# Patient Record
Sex: MALE | Race: WHITE | Employment: OTHER | URBAN - METROPOLITAN AREA
[De-identification: names, ages, dates, MRNs, and addresses within clinical notes are randomized per-mention and may not be internally consistent; named-entity substitution may affect disease eponyms.]

---

## 2021-01-15 ENCOUNTER — HOSPITAL ENCOUNTER (INPATIENT)
Age: 71
LOS: 5 days | Discharge: HOME | DRG: 177 | End: 2021-01-20
Attending: EMERGENCY MEDICINE | Admitting: STUDENT IN AN ORGANIZED HEALTH CARE EDUCATION/TRAINING PROGRAM
Payer: MEDICARE

## 2021-01-15 ENCOUNTER — APPOINTMENT (OUTPATIENT)
Dept: GENERAL RADIOLOGY | Age: 71
DRG: 177 | End: 2021-01-15
Payer: MEDICARE

## 2021-01-15 DIAGNOSIS — U07.1 COVID-19: Primary | ICD-10-CM

## 2021-01-15 DIAGNOSIS — R06.89 DYSPNEA AND RESPIRATORY ABNORMALITIES: ICD-10-CM

## 2021-01-15 DIAGNOSIS — R06.00 DYSPNEA AND RESPIRATORY ABNORMALITIES: ICD-10-CM

## 2021-01-15 DIAGNOSIS — D72.819 LEUKOPENIA, UNSPECIFIED TYPE: ICD-10-CM

## 2021-01-15 PROBLEM — J12.82 PNEUMONIA DUE TO COVID-19 VIRUS: Status: ACTIVE | Noted: 2021-01-15

## 2021-01-15 LAB
ALBUMIN SERPL-MCNC: 3.4 GM/DL (ref 3.4–5)
ALP BLD-CCNC: 58 IU/L (ref 40–129)
ALT SERPL-CCNC: 33 U/L (ref 10–40)
ANION GAP SERPL CALCULATED.3IONS-SCNC: 14 MMOL/L (ref 4–16)
AST SERPL-CCNC: 31 IU/L (ref 15–37)
BANDED NEUTROPHILS ABSOLUTE COUNT: 0.25 K/CU MM
BANDED NEUTROPHILS RELATIVE PERCENT: 10 % (ref 5–11)
BASE EXCESS: 1 (ref 0–3.3)
BILIRUB SERPL-MCNC: 0.3 MG/DL (ref 0–1)
BUN BLDV-MCNC: 16 MG/DL (ref 6–23)
CALCIUM SERPL-MCNC: 8.4 MG/DL (ref 8.3–10.6)
CHLORIDE BLD-SCNC: 97 MMOL/L (ref 99–110)
CO2: 22 MMOL/L (ref 21–32)
COMMENT: ABNORMAL
CREAT SERPL-MCNC: 0.8 MG/DL (ref 0.9–1.3)
DIFFERENTIAL TYPE: ABNORMAL
GFR AFRICAN AMERICAN: >60 ML/MIN/1.73M2
GFR NON-AFRICAN AMERICAN: >60 ML/MIN/1.73M2
GLUCOSE BLD-MCNC: 118 MG/DL (ref 70–99)
HCO3 VENOUS: 24.9 MMOL/L (ref 19–25)
HCT VFR BLD CALC: 41.1 % (ref 42–52)
HEMOGLOBIN: 13.4 GM/DL (ref 13.5–18)
LACTATE: 1.5 MMOL/L (ref 0.4–2)
LYMPHOCYTES ABSOLUTE: 0.7 K/CU MM
LYMPHOCYTES RELATIVE PERCENT: 28 % (ref 24–44)
MAGNESIUM: 1.8 MG/DL (ref 1.8–2.4)
MCH RBC QN AUTO: 30.5 PG (ref 27–31)
MCHC RBC AUTO-ENTMCNC: 32.6 % (ref 32–36)
MCV RBC AUTO: 93.4 FL (ref 78–100)
MONOCYTES ABSOLUTE: 0.4 K/CU MM
MONOCYTES RELATIVE PERCENT: 16 % (ref 0–4)
O2 SAT, VEN: 77.4 % (ref 50–70)
PCO2, VEN: 43 MMHG (ref 38–52)
PDW BLD-RTO: 14.1 % (ref 11.7–14.9)
PH VENOUS: 7.37 (ref 7.32–7.42)
PLATELET # BLD: 168 K/CU MM (ref 140–440)
PLT MORPHOLOGY: ABNORMAL
PMV BLD AUTO: 11 FL (ref 7.5–11.1)
PO2, VEN: 47 MMHG (ref 28–48)
POTASSIUM SERPL-SCNC: 3.7 MMOL/L (ref 3.5–5.1)
PRO-BNP: 60.77 PG/ML
RBC # BLD: 4.4 M/CU MM (ref 4.6–6.2)
SARS-COV-2, NAAT: DETECTED
SEGMENTED NEUTROPHILS ABSOLUTE COUNT: 1.1 K/CU MM
SEGMENTED NEUTROPHILS RELATIVE PERCENT: 46 % (ref 36–66)
SODIUM BLD-SCNC: 133 MMOL/L (ref 135–145)
TOTAL PROTEIN: 7 GM/DL (ref 6.4–8.2)
TROPONIN T: <0.01 NG/ML
WBC # BLD: 2.5 K/CU MM (ref 4–10.5)
WBC # BLD: ABNORMAL 10*3/UL

## 2021-01-15 PROCEDURE — 94640 AIRWAY INHALATION TREATMENT: CPT

## 2021-01-15 PROCEDURE — 83605 ASSAY OF LACTIC ACID: CPT

## 2021-01-15 PROCEDURE — 85007 BL SMEAR W/DIFF WBC COUNT: CPT

## 2021-01-15 PROCEDURE — 87899 AGENT NOS ASSAY W/OPTIC: CPT

## 2021-01-15 PROCEDURE — 83735 ASSAY OF MAGNESIUM: CPT

## 2021-01-15 PROCEDURE — 2580000003 HC RX 258: Performed by: PHYSICIAN ASSISTANT

## 2021-01-15 PROCEDURE — U0002 COVID-19 LAB TEST NON-CDC: HCPCS

## 2021-01-15 PROCEDURE — 6360000002 HC RX W HCPCS: Performed by: PHYSICIAN ASSISTANT

## 2021-01-15 PROCEDURE — 84484 ASSAY OF TROPONIN QUANT: CPT

## 2021-01-15 PROCEDURE — 82805 BLOOD GASES W/O2 SATURATION: CPT

## 2021-01-15 PROCEDURE — 6370000000 HC RX 637 (ALT 250 FOR IP): Performed by: PHYSICIAN ASSISTANT

## 2021-01-15 PROCEDURE — 96360 HYDRATION IV INFUSION INIT: CPT

## 2021-01-15 PROCEDURE — 87804 INFLUENZA ASSAY W/OPTIC: CPT

## 2021-01-15 PROCEDURE — 93005 ELECTROCARDIOGRAM TRACING: CPT | Performed by: PHYSICIAN ASSISTANT

## 2021-01-15 PROCEDURE — 2700000000 HC OXYGEN THERAPY PER DAY

## 2021-01-15 PROCEDURE — 71045 X-RAY EXAM CHEST 1 VIEW: CPT

## 2021-01-15 PROCEDURE — 94761 N-INVAS EAR/PLS OXIMETRY MLT: CPT

## 2021-01-15 PROCEDURE — 87040 BLOOD CULTURE FOR BACTERIA: CPT

## 2021-01-15 PROCEDURE — 93010 ELECTROCARDIOGRAM REPORT: CPT | Performed by: INTERNAL MEDICINE

## 2021-01-15 PROCEDURE — 1200000000 HC SEMI PRIVATE

## 2021-01-15 PROCEDURE — 80053 COMPREHEN METABOLIC PANEL: CPT

## 2021-01-15 PROCEDURE — 85027 COMPLETE CBC AUTOMATED: CPT

## 2021-01-15 PROCEDURE — 83880 ASSAY OF NATRIURETIC PEPTIDE: CPT

## 2021-01-15 PROCEDURE — 99285 EMERGENCY DEPT VISIT HI MDM: CPT

## 2021-01-15 RX ORDER — GUAIFENESIN 600 MG/1
600 TABLET, EXTENDED RELEASE ORAL 2 TIMES DAILY
Status: DISCONTINUED | OUTPATIENT
Start: 2021-01-15 | End: 2021-01-20 | Stop reason: HOSPADM

## 2021-01-15 RX ORDER — LEVOTHYROXINE SODIUM 0.05 MG/1
50 TABLET ORAL DAILY
COMMUNITY

## 2021-01-15 RX ORDER — ATORVASTATIN CALCIUM 40 MG/1
40 TABLET, FILM COATED ORAL DAILY
Status: DISCONTINUED | OUTPATIENT
Start: 2021-01-15 | End: 2021-01-20 | Stop reason: HOSPADM

## 2021-01-15 RX ORDER — LOSARTAN POTASSIUM 25 MG/1
25 TABLET ORAL DAILY
COMMUNITY

## 2021-01-15 RX ORDER — SODIUM CHLORIDE 9 MG/ML
INJECTION, SOLUTION INTRAVENOUS PRN
Status: DISCONTINUED | OUTPATIENT
Start: 2021-01-15 | End: 2021-01-20 | Stop reason: HOSPADM

## 2021-01-15 RX ORDER — DEXAMETHASONE SODIUM PHOSPHATE 10 MG/ML
6 INJECTION, SOLUTION INTRAMUSCULAR; INTRAVENOUS EVERY 6 HOURS
Status: DISCONTINUED | OUTPATIENT
Start: 2021-01-15 | End: 2021-01-15 | Stop reason: HOSPADM

## 2021-01-15 RX ORDER — HYDROCODONE BITARTRATE AND ACETAMINOPHEN 10; 325 MG/1; MG/1
1 TABLET ORAL EVERY 4 HOURS PRN
COMMUNITY

## 2021-01-15 RX ORDER — HYDROCODONE BITARTRATE AND ACETAMINOPHEN 10; 325 MG/15ML; MG/15ML
5 SOLUTION ORAL EVERY 4 HOURS PRN
COMMUNITY
End: 2021-01-15

## 2021-01-15 RX ORDER — DONEPEZIL HYDROCHLORIDE 10 MG/1
10 TABLET, FILM COATED ORAL NIGHTLY
COMMUNITY

## 2021-01-15 RX ORDER — GLIPIZIDE 5 MG/1
2.5 TABLET ORAL
Status: DISCONTINUED | OUTPATIENT
Start: 2021-01-16 | End: 2021-01-20 | Stop reason: HOSPADM

## 2021-01-15 RX ORDER — METHYLPREDNISOLONE SODIUM SUCCINATE 40 MG/ML
40 INJECTION, POWDER, LYOPHILIZED, FOR SOLUTION INTRAMUSCULAR; INTRAVENOUS EVERY 12 HOURS
Status: DISCONTINUED | OUTPATIENT
Start: 2021-01-15 | End: 2021-01-20

## 2021-01-15 RX ORDER — ACETAMINOPHEN 500 MG
1000 TABLET ORAL ONCE
Status: COMPLETED | OUTPATIENT
Start: 2021-01-15 | End: 2021-01-15

## 2021-01-15 RX ORDER — GLIPIZIDE 5 MG/1
2.5 TABLET ORAL
COMMUNITY

## 2021-01-15 RX ORDER — MECLIZINE HYDROCHLORIDE CHEWABLE TABLETS 25 MG/1
25 TABLET, CHEWABLE ORAL 2 TIMES DAILY PRN
COMMUNITY

## 2021-01-15 RX ORDER — SODIUM CHLORIDE 0.9 % (FLUSH) 0.9 %
10 SYRINGE (ML) INJECTION EVERY 12 HOURS SCHEDULED
Status: DISCONTINUED | OUTPATIENT
Start: 2021-01-15 | End: 2021-01-20 | Stop reason: HOSPADM

## 2021-01-15 RX ORDER — ACETAMINOPHEN 325 MG/1
650 TABLET ORAL EVERY 6 HOURS PRN
Status: DISCONTINUED | OUTPATIENT
Start: 2021-01-15 | End: 2021-01-20 | Stop reason: HOSPADM

## 2021-01-15 RX ORDER — ATORVASTATIN CALCIUM 80 MG/1
40 TABLET, FILM COATED ORAL DAILY
COMMUNITY

## 2021-01-15 RX ORDER — PANTOPRAZOLE SODIUM 40 MG/1
40 TABLET, DELAYED RELEASE ORAL
Status: DISCONTINUED | OUTPATIENT
Start: 2021-01-16 | End: 2021-01-20 | Stop reason: HOSPADM

## 2021-01-15 RX ORDER — IBUPROFEN 600 MG/1
600 TABLET ORAL EVERY 8 HOURS PRN
Status: ON HOLD | COMMUNITY
End: 2021-01-20 | Stop reason: HOSPADM

## 2021-01-15 RX ORDER — PROMETHAZINE HYDROCHLORIDE 25 MG/1
12.5 TABLET ORAL EVERY 6 HOURS PRN
Status: DISCONTINUED | OUTPATIENT
Start: 2021-01-15 | End: 2021-01-20 | Stop reason: HOSPADM

## 2021-01-15 RX ORDER — OMEPRAZOLE 20 MG/1
20 CAPSULE, DELAYED RELEASE ORAL 2 TIMES DAILY
COMMUNITY

## 2021-01-15 RX ORDER — ACETAMINOPHEN 500 MG
TABLET ORAL
Status: DISPENSED
Start: 2021-01-15 | End: 2021-01-15

## 2021-01-15 RX ORDER — HYDROCODONE BITARTRATE AND ACETAMINOPHEN 10; 325 MG/1; MG/1
1 TABLET ORAL EVERY 4 HOURS PRN
Status: DISCONTINUED | OUTPATIENT
Start: 2021-01-15 | End: 2021-01-20 | Stop reason: HOSPADM

## 2021-01-15 RX ORDER — LEVOTHYROXINE SODIUM 0.05 MG/1
50 TABLET ORAL DAILY
Status: DISCONTINUED | OUTPATIENT
Start: 2021-01-16 | End: 2021-01-20 | Stop reason: HOSPADM

## 2021-01-15 RX ORDER — DONEPEZIL HYDROCHLORIDE 10 MG/1
10 TABLET, FILM COATED ORAL NIGHTLY
Status: DISCONTINUED | OUTPATIENT
Start: 2021-01-15 | End: 2021-01-20 | Stop reason: HOSPADM

## 2021-01-15 RX ORDER — ONDANSETRON 2 MG/ML
4 INJECTION INTRAMUSCULAR; INTRAVENOUS EVERY 6 HOURS PRN
Status: DISCONTINUED | OUTPATIENT
Start: 2021-01-15 | End: 2021-01-20 | Stop reason: HOSPADM

## 2021-01-15 RX ORDER — LOSARTAN POTASSIUM 25 MG/1
25 TABLET ORAL DAILY
Status: DISCONTINUED | OUTPATIENT
Start: 2021-01-15 | End: 2021-01-20 | Stop reason: HOSPADM

## 2021-01-15 RX ORDER — GUAIFENESIN/DEXTROMETHORPHAN 100-10MG/5
5 SYRUP ORAL ONCE
Status: COMPLETED | OUTPATIENT
Start: 2021-01-15 | End: 2021-01-15

## 2021-01-15 RX ORDER — SODIUM CHLORIDE 0.9 % (FLUSH) 0.9 %
10 SYRINGE (ML) INJECTION PRN
Status: DISCONTINUED | OUTPATIENT
Start: 2021-01-15 | End: 2021-01-20 | Stop reason: HOSPADM

## 2021-01-15 RX ORDER — 0.9 % SODIUM CHLORIDE 0.9 %
500 INTRAVENOUS SOLUTION INTRAVENOUS ONCE
Status: COMPLETED | OUTPATIENT
Start: 2021-01-15 | End: 2021-01-15

## 2021-01-15 RX ADMIN — ACETAMINOPHEN 1000 MG: 500 TABLET ORAL at 11:46

## 2021-01-15 RX ADMIN — Medication 2 PUFF: at 11:35

## 2021-01-15 RX ADMIN — AZITHROMYCIN MONOHYDRATE 500 MG: 500 INJECTION, POWDER, LYOPHILIZED, FOR SOLUTION INTRAVENOUS at 13:55

## 2021-01-15 RX ADMIN — CEFTRIAXONE 1 G: 1 INJECTION, POWDER, FOR SOLUTION INTRAMUSCULAR; INTRAVENOUS at 13:15

## 2021-01-15 RX ADMIN — DEXAMETHASONE SODIUM PHOSPHATE 6 MG: 10 INJECTION, SOLUTION INTRAMUSCULAR; INTRAVENOUS at 13:15

## 2021-01-15 RX ADMIN — GUAIFENESIN AND DEXTROMETHORPHAN 5 ML: 100; 10 SYRUP ORAL at 11:46

## 2021-01-15 RX ADMIN — SODIUM CHLORIDE 500 ML: 9 INJECTION, SOLUTION INTRAVENOUS at 11:46

## 2021-01-15 ASSESSMENT — PAIN SCALES - GENERAL
PAINLEVEL_OUTOF10: 6
PAINLEVEL_OUTOF10: 1

## 2021-01-15 ASSESSMENT — PAIN DESCRIPTION - PAIN TYPE: TYPE: ACUTE PAIN

## 2021-01-15 ASSESSMENT — PAIN DESCRIPTION - ORIENTATION: ORIENTATION: MID

## 2021-01-15 NOTE — ED PROVIDER NOTES
EMERGENCY DEPARTMENT ENCOUNTER      PCP: No primary care provider on file. CHIEF COMPLAINT    Chief Complaint   Patient presents with    Shortness of Breath    Cough    Chest Pain     Of note, this patient was also evaluated by the attending physician, Dr. Yudy Valdez. HPI    Vadim Brandt is a 78 y.o. male who presents to the emergency department today with dyspnea on exertion, cough, intermittent chest pains. He states that his symptoms have developed over the last 2-3 days. He does admit to low-grade fevers. Patient does not require oxygen, he denies smoking, does have a history of COPD, he states he has recurrent bronchitis. He denies any significant cardiac issues. REVIEW OF SYSTEMS    Constitutional: Admits generalized fatigue, weakness. denies fever, chills, weight loss or weakness   HENT:  Denies sore throat or ear pain   Cardiovascular:  No chest pain. No palpitations, No syncope  Respiratory:  See HPI. GI:  Denies abdominal pain, nausea, vomiting, or diarrhea  :  Denies any urinary symptoms or vaginal symptoms.    Musculoskeletal:  Denies back pain,   Skin:  Denies rash  Neurologic:  Denies headache, focal weakness or sensory changes   Endocrine:  Denies polyuria or polydypsia   Lymphatic:  Denies swollen glands   All other review of systems are negative  See HPI and nursing notes for additional information     PAST MEDICAL & SURGICAL HISTORY    Past Medical History:   Diagnosis Date    [facility]     Diabetes mellitus ([facility]     GERD (gastroesophageal reflux disease)     Hyperlipidemia     Hypertension      Past Surgical History:   Procedure Laterality Date    APPENDECTOMY      BACK SURGERY      HERNIA REPAIR      JOINT REPLACEMENT      STOMACH SURGERY         CURRENT MEDICATIONS    Current Outpatient Rx   Medication Sig Dispense Refill    HYDROcodone-acetaminophen (NORCO)  MG per tablet Take 1 tablet by mouth every 4 hours as needed for Pain.       omeprazole (PRILOSEC) 20 MG delayed release capsule Take 20 mg by mouth 2 times daily      metFORMIN (GLUCOPHAGE) 1000 MG tablet Take 1,000 mg by mouth 2 times daily (with meals)      glipiZIDE (GLUCOTROL) 5 MG tablet Take 2.5 mg by mouth 2 times daily (before meals)      atorvastatin (LIPITOR) 80 MG tablet Take 40 mg by mouth daily      meclizine (ANTIVERT) 25 MG CHEW Take 25 mg by mouth 2 times daily as needed      levothyroxine (SYNTHROID) 50 MCG tablet Take 50 mcg by mouth Daily      vitamin D (CHOLECALCIFEROL) 25 MCG (1000 UT) TABS tablet Take 1,000 Units by mouth daily      ibuprofen (ADVIL;MOTRIN) 600 MG tablet Take 600 mg by mouth every 8 hours as needed for Pain      losartan (COZAAR) 25 MG tablet Take 25 mg by mouth daily      donepezil (ARICEPT) 10 MG tablet Take 10 mg by mouth nightly      Albuterol Sulfate (PROAIR HFA IN) Inhale 2 puffs into the lungs 2 times daily         ALLERGIES    No Known Allergies    SOCIAL & FAMILY HISTORY    Social History     Socioeconomic History    Marital status:      Spouse name: Not on file    Number of children: Not on file    Years of education: Not on file    Highest education level: Not on file   Occupational History    Not on file   Social Needs    Financial resource strain: Not on file    Food insecurity     Worry: Not on file     Inability: Not on file    Transportation needs     Medical: Not on file     Non-medical: Not on file   Tobacco Use    Smoking status: Former Smoker    Smokeless tobacco: Never Used   Substance and Sexual Activity    Alcohol use: Yes     Comment: occasionally    Drug use: Never    Sexual activity: Not on file   Lifestyle    Physical activity     Days per week: Not on file     Minutes per session: Not on file    Stress: Not on file   Relationships    Social connections     Talks on phone: Not on file     Gets together: Not on file     Attends Latter-day service: Not on file     Active member of club or organization: Not on file     Attends meetings of clubs or organizations: Not on file     Relationship status: Not on file    Intimate partner violence     Fear of current or ex partner: Not on file     Emotionally abused: Not on file     Physically abused: Not on file     Forced sexual activity: Not on file   Other Topics Concern    Not on file   Social History Narrative    Not on file     No family history on file. PHYSICAL EXAM    VITAL SIGNS: /67   Pulse 85   Temp 99.3 °F (37.4 °C) (Oral)   Resp 18   Ht 5' 6\" (1.676 m)   Wt 180 lb (81.6 kg)   SpO2 95%   BMI 29.05 kg/m²    Constitutional:  Well developed, well nourished, no acute distress   HENT:  Atraumatic, moist mucus membranes  Neck/Lymphatics: supple, no JVD, no swollen nodes  Respiratory:   Nonlabored breathing. Rate 18. Lungs diminished lung sounds, no obvious wheezing or rales, no rhonchi's., no retractions   Cardiovascular:  Rate regular, normal Rhythm,  no murmurs/rubs/gallops. No carotid bruits or murmurs heard in carotids. No JVD  GI:  Soft, nontender, normal bowel sounds  Musculoskeletal:    There is no edema, asymmetry, or calf / thigh tenderness bilaterally. No cyanosis. No cool or pale-appearing limb.   Distal cap refill and pulses intact bilateral upper and lower extremities  Bilateral upper and lower extremity ROM intact without pain or obvious deficit  Integument:  Skin is warm and dry, no petechiae   Neurologic:  Alert & oriented, no slurred speech  Psych: Pleasant affect, no hallucinations      EKG    See supervising physicians note for EKG interpretation    LABS:  Results for orders placed or performed during the hospital encounter of 01/15/21   CBC auto diff   Result Value Ref Range    WBC 2.5 (L) 4.0 - 10.5 K/CU MM    RBC 4.40 (L) 4.6 - 6.2 M/CU MM    Hemoglobin 13.4 (L) 13.5 - 18.0 GM/DL    Hematocrit 41.1 (L) 42 - 52 %    MCV 93.4 78 - 100 FL    MCH 30.5 27 - 31 PG    MCHC 32.6 32.0 - 36.0 %    RDW Normal sinus rhythm  Septal infarct , age undetermined  Abnormal ECG  No previous ECGs available  Confirmed by Lucas Favre MD, Lili Pate ([id number]) on 1/15/2021 4:34:28 PM       RADIOLOGY/PROCEDURES    Xr Chest Portable    Result Date: 1/15/2021  EXAMINATION: ONE XRAY VIEW OF THE CHEST 1/15/2021 11:21 am COMPARISON: None. HISTORY: ORDERING SYSTEM PROVIDED HISTORY: SOB TECHNOLOGIST PROVIDED HISTORY: Reason for exam:->SOB Reason for Exam: SOB FINDINGS: There is evidence of left lung infiltrates and the suggestion also of a subtle right upper lobe infiltrate. These changes are compatible with pneumonia. Mild cardiomegaly. COPD. No active pleural disease. Bilateral perihilar infiltrates worse on the left. Pneumonia suspected. ED COURSE & MEDICAL DECISION MAKING      Patient presents as above. Emerge etiologies considered. Patient having worsening shortness of breath, chest pain, cough, intermittent fevers over the last 2 or 3 days. On upon arrival oxygen was in the high 80s low 90s on exertion. Requiring oxygenation. Patient tested positive for COVID-19, has signs of atypical pneumonia, no significant EKG changes, elevation of troponin detectable. Patient will be initiated on broad-spectrum antibiotics, given steroid, he will be admitted to the hospitalist team for further monitoring and treatment. Patient was staffed with supervising physician, Dr. Dontrell Lr. Vital signs and nursing notes reviewed during ED course. All pertinent Lab data and radiographic results reviewed with patient at bedside. The patient and/or the family were informed of the results of any tests/labs/imaging, the treatment plan, and time was allotted to answer questions. Clinical  IMPRESSION    1. COVID-19    2. Dyspnea and respiratory abnormalities    3.  Leukopenia, unspecified type      Comment: Please note this report has been produced using speech recognition software and may contain errors related to that system including errors in grammar, punctuation, and spelling, as well as words and phrases that may be inappropriate. If there are any questions or concerns please feel free to contact the dictating provider for clarification.                         Candy Estevez PA  01/15/21 1736

## 2021-01-15 NOTE — ED NOTES
Report to [facility] RN 2E.    Questions answered and voiced understanding     Toni Preston RN  01/15/21 2416

## 2021-01-15 NOTE — H&P
History and Physical      Name:  Naomy Kulkarni /Age/Sex: 1950  (78 y.o. male)   MRN & CSN:  [id number] & [id number] Admission Date/Time: 1/15/2021 11:08 AM   Location:  ED19/ED-19 PCP: No primary care provider on file. Hospital Day: 1    Assessment and Plan:   Naomy Kulkarni is a 78 y.o.  male  who presents with:    Acute respiratory failure with hypoxia secondary to COVID-19 viral pneumonia infection  SIRS criteria met at time of admission secondary to sepsis from viral pneumonia  COVID-19 positive: 1/15/21  IV antibiotics, IV steroids initiated on admission  Convalescent plasma given: on admission  Remdesivir:ordered  Oxygen requirement today:2L  COPD   DM type 2   HLD   GERD   Ess HTN     Continue supplemental oxygen support  Dexamethazone initiated  Need to check A1C    Diet No diet orders on file   DVT Prophylaxis [] Lovenox, []  Heparin, [] SCDs, [] No VTE prophylaxis, patient ambulating   GI Prophylaxis [] PPI, [] H2 Blocker, [] No GI prophylaxis, patient is receiving diet/Tube Feeds   Code Status No Order   Disposition Patient requires continued admission due to oxygen support    Dc plan in progress   MDM [] Low, [] Moderate,[x]  High  Patient's risk as above due to     [] One or more chronic illnesses with severe exacerbation or progression    [x] Acute or chronic illnesses or injuries that pose a threat to life or bodily function    [] An abrupt change in neurological status    [] Decision not to resuscitate     [] Drug therapy requiring intensive monitoring for toxicity      History of Present Illness:     Chief Complaint:   Naomy Kulkarni is a 78 y.o.  male  who presents with worsening sob. W/w movement. A/w intermittent chest pain and cough. Admits to some associated fevers. Symptoms began 3-5 days prior to presenting to the ED and have progressively worsened.   In the ED he was found to be positive for covid 19.     10-14 point ROS reviewed negative, unless as noted above    Objective:   No intake or output data in the 24 hours ending 01/15/21 1258   Vitals:   Vitals:    01/15/21 1231   BP: (!) 109/53   Pulse: 74   Resp:    Temp:    SpO2: 97%     Physical Exam: *       Past Medical History: PMHx:   Past Medical History:   Diagnosis Date    [facility] ([facility]     Diabetes mellitus ([facility]     GERD (gastroesophageal reflux disease)     Hyperlipidemia     Hypertension      PSHx:  has a past surgical history that includes Appendectomy; hernia repair; joint replacement; back surgery; and Stomach surgery. Allergies: No Known Allergies  Home Medications:   Prior to Admission medications    Medication Sig Start Date End Date Taking? Authorizing Provider   HYDROcodone-acetaminophen (NORCO)  MG per tablet Take 1 tablet by mouth every 4 hours as needed for Pain.    Yes Historical Provider, MD   omeprazole (PRILOSEC) 20 MG delayed release capsule Take 20 mg by mouth 2 times daily   Yes Historical Provider, MD   metFORMIN (GLUCOPHAGE) 1000 MG tablet Take 1,000 mg by mouth 2 times daily (with meals)   Yes Historical Provider, MD   glipiZIDE (GLUCOTROL) 5 MG tablet Take 2.5 mg by mouth 2 times daily (before meals)   Yes Historical Provider, MD   atorvastatin (LIPITOR) 80 MG tablet Take 40 mg by mouth daily   Yes Historical Provider, MD   meclizine (ANTIVERT) 25 MG CHEW Take 25 mg by mouth 2 times daily as needed   Yes Historical Provider, MD   levothyroxine (SYNTHROID) 50 MCG tablet Take 50 mcg by mouth Daily   Yes Historical Provider, MD   vitamin D (CHOLECALCIFEROL) 25 MCG (1000 UT) TABS tablet Take 1,000 Units by mouth daily   Yes Historical Provider, MD   ibuprofen (ADVIL;MOTRIN) 600 MG tablet Take 600 mg by mouth every 8 hours as needed for Pain   Yes Historical Provider, MD   losartan (COZAAR) 25 MG tablet Take 25 mg by mouth daily   Yes Historical Provider, MD   donepezil (ARICEPT) 10 MG tablet Take 10 mg by mouth nightly   Yes Historical Provider, MD   Albuterol Sulfate (PROAIR HFA IN) Inhale 2 puffs into the lungs 2 times daily   Yes Historical Provider, MD     FHx: reviewed and is noncontributory   SHx:   Social History     Socioeconomic History    Marital status:      Spouse name: Not on file    Number of children: Not on file    Years of education: Not on file    Highest education level: Not on file   Occupational History    Not on file   Social Needs    Financial resource strain: Not on file    Food insecurity     Worry: Not on file     Inability: Not on file    Transportation needs     Medical: Not on file     Non-medical: Not on file   Tobacco Use    Smoking status: Former Smoker    Smokeless tobacco: Never Used   Substance and Sexual Activity    Alcohol use: Yes     Comment: occasionally    Drug use: Never    Sexual activity: Not on file   Lifestyle    Physical activity     Days per week: Not on file     Minutes per session: Not on file    Stress: Not on file   Relationships    Social connections     Talks on phone: Not on file     Gets together: Not on file     Attends Islam service: Not on file     Active member of club or organization: Not on file     Attends meetings of clubs or organizations: Not on file     Relationship status: Not on file    Intimate partner violence     Fear of current or ex partner: Not on file     Emotionally abused: Not on file     Physically abused: Not on file     Forced sexual activity: Not on file   Other Topics Concern    Not on file   Social History Narrative    Not on file       Medications:   Medications:    dexamethasone  6 mg Intravenous Q6H    cefTRIAXone (ROCEPHIN) IV  1 g Intravenous Once    azithromycin  500 mg Intravenous Once      Infusions:   PRN Meds:        Electronically signed by Kyler Benton DO on 1/15/2021 at 12:58 PM

## 2021-01-15 NOTE — ED PROVIDER NOTES
ATTENDING NOTE:    I discussed this patient's history and physical findings and reviewed the PA's findings with them, as well as performed an independent assessment and coordinated care with them. My additional findings are:    HISTORY OF PRESENT ILLNESS:  Chief Complaint   Patient presents with    Shortness of Breath    Cough    Chest Pain   . Hans Peralta is a 78 y.o. male who presents with complaints of shortness of breath, cough and chest tightness in the center of the chest with onset about 2 days ago. Also reports loss of taste about 4 days ago. PHYSICAL EXAM:  VITAL SIGNS:   ED Triage Vitals   Enc Vitals Group      BP 01/15/21 1120 103/72      Pulse 01/15/21 1116 79      Resp 01/15/21 1116 18      Temp 01/15/21 1116 99.3 °F (37.4 °C)      Temp Source 01/15/21 1116 Oral      SpO2 01/15/21 1116 94 %      Weight 01/15/21 1116 180 lb (81.6 kg)      Height 01/15/21 1116 5' 6\" (1.676 m)      Head Circumference --       Peak Flow --       Pain Score --       Pain Loc --       Pain Edu? --       Excl. in [address] during ED course were reviewed and are as charted. Key Physical Exam Findings:    Constitutional: Minimal distress, Non-toxic appearance    Eyes:  Conjunctiva normal, No discharge    HENT: Normocephalic, Atraumatic, Bilateral external ears normal, posterior oropharynx is nonerythematous and without exudate, uvula is midline, no trismus, no \"hot potato voice\" or dysphonia, oropharynx moist    Neck: Supple, no stridor, no grossly visible or palpable masses    Cardiovascular: Regular rate and rhythm, No murmurs, No rubs, No gallops    Pulmonary/Chest:  Normal breath sounds, No respiratory distress or accessory muscle use, No wheezing, crackles or rhonchi. Abdomen:  Soft, nondistended and nonrigid, No tenderness or peritoneal signs, No masses, normal bowel sounds    Back:  No midline point tenderness, No paraspinous muscle tenderness.   No CVA tenderness    Extremities:  No gross deformities, no edema, no tenderness    Neurologic:  Normal motor function, Normal sensory function, No focal deficits    Skin:  Warm, Dry, No erythema, No rash, No cyanosis, No mottling    Lymphatic:  No lymphadenopathy in the following location(s): cervical    Psychiatric:  Alert and oriented x3, Affect normal      EKG Interpretation    Interpreted by emergency department physician    Rhythm: normal sinus   Rate: normal  Axis: normal  Ectopy: none  Conduction: normal  ST Segments: normal  T Waves: normal  Q Waves: none    Clinical Impression: Normal sinus rhythm        RADIOLOGY/PROCEDURES/LABS/MEDICATIONS ADMINISTERED:    I have reviewed and interpreted all of the currently available lab results from this visit (if applicable):  Results for orders placed or performed during the hospital encounter of 01/15/21   CBC auto diff   Result Value Ref Range    WBC 2.5 (L) 4.0 - 10.5 K/CU MM    RBC 4.40 (L) 4.6 - 6.2 M/CU MM    Hemoglobin 13.4 (L) 13.5 - 18.0 GM/DL    Hematocrit 41.1 (L) 42 - 52 %    MCV 93.4 78 - 100 FL    MCH 30.5 27 - 31 PG    MCHC 32.6 32.0 - 36.0 %    RDW 14.1 11.7 - 14.9 %    Platelets 605 175 - 703 K/CU MM    MPV 11.0 7.5 - 11.1 FL    Bands Relative 10 5 - 11 %    Segs Relative 46.0 36 - 66 %    Lymphocytes % 28.0 24 - 44 %    Monocytes % 16.0 (H) 0 - 4 %    Bands Absolute 0.25 K/CU MM    Segs Absolute 1.1 K/CU MM    Lymphocytes Absolute 0.7 K/CU MM    Monocytes Absolute 0.4 K/CU MM    Differential Type MANUAL DIFFERENTIAL     WBC Morphology OCCASIONAL     PLT Morphology FEW    CMP   Result Value Ref Range    Sodium 133 (L) 135 - 145 MMOL/L    Potassium 3.7 3.5 - 5.1 MMOL/L    Chloride 97 (L) 99 - 110 mMol/L    CO2 22 21 - 32 MMOL/L    BUN 16 6 - 23 MG/DL    CREATININE 0.8 (L) 0.9 - 1.3 MG/DL    Glucose 118 (H) 70 - 99 MG/DL    Calcium 8.4 8.3 - 10.6 MG/DL    Alb 3.4 3.4 - 5.0 GM/DL    Total Protein 7.0 6.4 - 8.2 GM/DL    Total Bilirubin 0.3 0.0 - 1.0 MG/DL    ALT 33 10 - 40 U/L    AST 31 15 - 37 IU/L    Alkaline Phosphatase 58 40 - 129 IU/L    GFR Non-African American >60 >60 mL/min/1.73m2    GFR African American >60 >60 mL/min/1.73m2    Anion Gap 14 4 - 16   Troponin   Result Value Ref Range    Troponin T <0.010 <0.01 NG/ML   Brain Natriuretic Peptide   Result Value Ref Range    Pro-BNP 60.77 <300 PG/ML   Lactic Acid, Plasma   Result Value Ref Range    Lactate 1.5 0.4 - 2.0 mMOL/L   Blood Gas, Venous   Result Value Ref Range    pH, Shawn 7.37 7.32 - 7.42    pCO2, Shawn 43 38 - 52 mmHG    pO2, Shawn 47 28 - 48 mmHG    Base Excess 1 0 - 3.3    HCO3, Venous 24.9 19 - 25 MMOL/L    O2 Sat, Shawn 77.4 (H) 50 - 70 %    Comment VBG    Magnesium   Result Value Ref Range    Magnesium 1.8 1.8 - 2.4 mg/dl   COVID-19    Specimen: Nasopharynx/Oropharynx   Result Value Ref Range    SARS-CoV-2, NAAT DETECTED (A)    Comprehensive Metabolic Panel w/ Reflex to MG   Result Value Ref Range    Sodium 135 135 - 145 MMOL/L    Potassium 4.6 3.5 - 5.1 MMOL/L    Chloride 103 99 - 110 mMol/L    CO2 22 21 - 32 MMOL/L    BUN 14 6 - 23 MG/DL    CREATININE 0.7 (L) 0.9 - 1.3 MG/DL    Glucose 203 (H) 70 - 99 MG/DL    Calcium 8.1 (L) 8.3 - 10.6 MG/DL    Alb 3.6 3.4 - 5.0 GM/DL    Total Protein 6.2 (L) 6.4 - 8.2 GM/DL    Total Bilirubin 0.2 0.0 - 1.0 MG/DL    ALT 29 10 - 40 U/L    AST 25 15 - 37 IU/L    Alkaline Phosphatase 55 40 - 128 IU/L    GFR Non-African American >60 >60 mL/min/1.73m2    GFR African American >60 >60 mL/min/1.73m2    Anion Gap 10 4 - 16   Fibrinogen   Result Value Ref Range    Fibrinogen 431 196.9 - 442.1 MG/DL   Procalcitonin   Result Value Ref Range    Procalcitonin 0.044    Ferritin   Result Value Ref Range    Ferritin 682 (H) 30 - 400 NG/ML   D-Dimer, Quantitative   Result Value Ref Range    D-Dimer, Quant 256 (H) <230 NG/mL(DDU)   High sensitivity CRP   Result Value Ref Range    CRP High Sensitivity 30.1 (H) <8.5 mg/L   EKG 12 Lead   Result Value Ref Range    Ventricular Rate 88 BPM    Atrial Rate 88 BPM    P-R Interval 156 ms    QRS Duration 78 ms    Q-T Interval 358 ms    QTc Calculation (Bazett) 433 ms    P Axis 14 degrees    R Axis 10 degrees    T Axis 21 degrees    Diagnosis       Normal sinus rhythm  Septal infarct , age undetermined  Abnormal ECG  No previous ECGs available  Confirmed by 146 Aylin Weinstein MD, Vania Forman ([id number]) on 1/15/2021 4:34:28 PM     TYPE AND SCREEN   Result Value Ref Range    ABO/Rh A POSITIVE     Antibody Screen NEGATIVE           ABNORMAL LABS:  Labs Reviewed   CBC WITH AUTO DIFFERENTIAL - Abnormal; Notable for the following components:       Result Value    WBC 2.5 (*)     RBC 4.40 (*)     Hemoglobin 13.4 (*)     Hematocrit 41.1 (*)     Monocytes % 16.0 (*)     All other components within normal limits   COMPREHENSIVE METABOLIC PANEL - Abnormal; Notable for the following components:    Sodium 133 (*)     Chloride 97 (*)     CREATININE 0.8 (*)     Glucose 118 (*)     All other components within normal limits   BLOOD GAS, VENOUS - Abnormal; Notable for the following components:    O2 Sat, Shawn 77.4 (*)     All other components within normal limits   COVID-19 - Abnormal; Notable for the following components:    SARS-CoV-2, NAAT DETECTED (*)     All other components within normal limits   COMPREHENSIVE METABOLIC PANEL W/ REFLEX TO MG FOR LOW K - Abnormal; Notable for the following components:    CREATININE 0.7 (*)     Glucose 203 (*)     Calcium 8.1 (*)     Total Protein 6.2 (*)     All other components within normal limits   FERRITIN - Abnormal; Notable for the following components:    Ferritin 682 (*)     All other components within normal limits   D-DIMER, QUANTITATIVE - Abnormal; Notable for the following components:    D-Dimer, Quant 256 (*)     All other components within normal limits   HIGH SENSITIVITY CRP - Abnormal; Notable for the following components:    CRP High Sensitivity 30.1 (*)     All other components within normal limits   RAPID INFLUENZA A/B ANTIGENS   CULTURE, BLOOD 1   CULTURE, BLOOD 2 STREP PNEUMONIAE ANTIGEN   LEGIONELLA ANTIGEN, URINE   TROPONIN   BRAIN NATRIURETIC PEPTIDE   LACTIC ACID, PLASMA   MAGNESIUM   FIBRINOGEN   PROCALCITONIN   COMPREHENSIVE METABOLIC PANEL W/ REFLEX TO MG FOR LOW K   FIBRINOGEN   D-DIMER, QUANTITATIVE   TYPE AND SCREEN   PREPARE COVID-19 CONVALESCENT PLASMA         IMAGING STUDIES ORDERED:  XR CHEST PORTABLE  SALINE LOCK IV  IP CONSULT TO HOSPITALIST  VERIFY INFORMED CONSENT  VITAL SIGNS PER TRANSFUSION PROTOCOL  TRANSFUSION REACTION MANAGEMENT  VITAL SIGNS  NURSING COMMUNICATION  PATIENT STATUS (FROM ED OR OR/PROCEDURAL)  TRANSFER PATIENT  DROPLET PLUS ISOLATION  REASON FOR NO MECHANICAL VTE PROPHYLAXIS  FULL CODE  DIET GENERAL  TRANSFUSE CONVALESCENT PLASMA  UP WITH ASSISTANCE      XR CHEST PORTABLE   Final Result   Bilateral perihilar infiltrates worse on the left. Pneumonia suspected.                MEDICATIONS ADMINISTERED:  Medications   atorvastatin (LIPITOR) tablet 40 mg (40 mg Oral Given 1/16/21 0855)   donepezil (ARICEPT) tablet 10 mg (10 mg Oral Given 1/16/21 0100)   glipiZIDE (GLUCOTROL) tablet 2.5 mg (2.5 mg Oral Given 1/16/21 0902)   HYDROcodone-acetaminophen (NORCO)  MG per tablet 1 tablet (has no administration in time range)   levothyroxine (SYNTHROID) tablet 50 mcg (50 mcg Oral Given 1/16/21 0710)   losartan (COZAAR) tablet 25 mg (25 mg Oral Given 1/16/21 0855)   metFORMIN (GLUCOPHAGE) tablet 1,000 mg (1,000 mg Oral Given 1/16/21 0855)   pantoprazole (PROTONIX) tablet 40 mg (40 mg Oral Given 1/16/21 0710)   0.9 % sodium chloride infusion (has no administration in time range)   sodium chloride flush 0.9 % injection 10 mL (10 mLs Intravenous Given 1/16/21 0856)   sodium chloride flush 0.9 % injection 10 mL (has no administration in time range)   enoxaparin (LOVENOX) injection 30 mg (30 mg Subcutaneous Given 1/16/21 0856)   promethazine (PHENERGAN) tablet 12.5 mg (has no administration in time range)     Or   ondansetron (ZOFRAN) injection 4 mg (has no administration in time range)   acetaminophen (TYLENOL) tablet 650 mg (has no administration in time range)     Or   acetaminophen (TYLENOL) suppository 650 mg (has no administration in time range)   bisacodyl (DULCOLAX) EC tablet 5 mg (has no administration in time range)   methylPREDNISolone sodium (SOLU-MEDROL) injection 40 mg (40 mg Intravenous Given 1/16/21 0855)   vitamin D CAPS 6,000 Units (6,000 Units Oral Given 1/16/21 0856)     Followed by   vitamin D CAPS 2,000 Units (has no administration in time range)   guaiFENesin (MUCINEX) extended release tablet 600 mg (600 mg Oral Given 1/16/21 0855)   0.9 % sodium chloride bolus (0 mLs Intravenous Stopped 1/15/21 1320)   ipratropium (ATROVENT HFA) 17 MCG/ACT inhaler 2 puff (2 puffs Inhalation Given 1/15/21 1135)   acetaminophen (TYLENOL) tablet 1,000 mg (1,000 mg Oral Given 1/15/21 1146)   guaiFENesin-dextromethorphan (ROBITUSSIN DM) 100-10 MG/5ML syrup 5 mL (5 mLs Oral Given 1/15/21 1146)   cefTRIAXone (ROCEPHIN) 1 g IVPB in 50 mL D5W minibag (0 g Intravenous Stopped 1/15/21 1358)   azithromycin (ZITHROMAX) 500 mg in dextrose 5 % 250 mL IVPB (0 mg Intravenous Stopped 1/15/21 1901)         PLAN/ED COURSE:  Last Vitals: /76   Pulse 54   Temp 98.2 °F (36.8 °C) (Oral)   Resp 20   Ht 5' 6\" (1.676 m)   Wt 180 lb (81.6 kg)   SpO2 95%   BMI 29.05 kg/m²       51-year-old male with COVID-19 requiring supplemental oxygen. Concern for possible superimposed secondary pneumonia as well. Patient started on antibiotics. Workup and treatment in the ED as documented above and in the physician assistants note. Pt will be admitted for further evaluation and treatment. Plan discussed with pt and/or family who expressed understanding and agreement with plan. Admitted in stable condition. Clinical Impression:  1. COVID-19    2. Dyspnea and respiratory abnormalities    3. Leukopenia, unspecified type        Disposition referral (if applicable):   No follow-up provider specified.     Disposition medications (if applicable):  Current Discharge Medication List          ED Provider Disposition Time  DISPOSITION Admitted 01/15/2021 12:57:29 PM            Electronically signed by Russell Macedo MD on 1/16/2021 at 9:41 AM        Russell Macedo MD  01/16/21 3672

## 2021-01-16 LAB
ABO/RH: NORMAL
ALBUMIN SERPL-MCNC: 3.6 GM/DL (ref 3.4–5)
ALP BLD-CCNC: 55 IU/L (ref 40–128)
ALT SERPL-CCNC: 29 U/L (ref 10–40)
ANION GAP SERPL CALCULATED.3IONS-SCNC: 10 MMOL/L (ref 4–16)
ANTIBODY SCREEN: NEGATIVE
AST SERPL-CCNC: 25 IU/L (ref 15–37)
BILIRUB SERPL-MCNC: 0.2 MG/DL (ref 0–1)
BUN BLDV-MCNC: 14 MG/DL (ref 6–23)
C-REACTIVE PROTEIN, HIGH SENSITIVITY: 30.1 MG/L
CALCIUM SERPL-MCNC: 8.1 MG/DL (ref 8.3–10.6)
CHLORIDE BLD-SCNC: 103 MMOL/L (ref 99–110)
CO2: 22 MMOL/L (ref 21–32)
CREAT SERPL-MCNC: 0.7 MG/DL (ref 0.9–1.3)
D DIMER: 256 NG/ML(DDU)
FERRITIN: 682 NG/ML (ref 30–400)
FIBRINOGEN LEVEL: 431 MG/DL (ref 196.9–442.1)
GFR AFRICAN AMERICAN: >60 ML/MIN/1.73M2
GFR NON-AFRICAN AMERICAN: >60 ML/MIN/1.73M2
GLUCOSE BLD-MCNC: 203 MG/DL (ref 70–99)
LEGIONELLA URINARY AG: NEGATIVE
POTASSIUM SERPL-SCNC: 4.6 MMOL/L (ref 3.5–5.1)
PROCALCITONIN: 0.04
SODIUM BLD-SCNC: 135 MMOL/L (ref 135–145)
TOTAL PROTEIN: 6.2 GM/DL (ref 6.4–8.2)

## 2021-01-16 PROCEDURE — 94761 N-INVAS EAR/PLS OXIMETRY MLT: CPT

## 2021-01-16 PROCEDURE — 82728 ASSAY OF FERRITIN: CPT

## 2021-01-16 PROCEDURE — 6360000002 HC RX W HCPCS: Performed by: STUDENT IN AN ORGANIZED HEALTH CARE EDUCATION/TRAINING PROGRAM

## 2021-01-16 PROCEDURE — 2580000003 HC RX 258: Performed by: INTERNAL MEDICINE

## 2021-01-16 PROCEDURE — 85379 FIBRIN DEGRADATION QUANT: CPT

## 2021-01-16 PROCEDURE — 86140 C-REACTIVE PROTEIN: CPT

## 2021-01-16 PROCEDURE — 86900 BLOOD TYPING SEROLOGIC ABO: CPT

## 2021-01-16 PROCEDURE — 2700000000 HC OXYGEN THERAPY PER DAY

## 2021-01-16 PROCEDURE — 85384 FIBRINOGEN ACTIVITY: CPT

## 2021-01-16 PROCEDURE — 87449 NOS EACH ORGANISM AG IA: CPT

## 2021-01-16 PROCEDURE — XW033E5 INTRODUCTION OF REMDESIVIR ANTI-INFECTIVE INTO PERIPHERAL VEIN, PERCUTANEOUS APPROACH, NEW TECHNOLOGY GROUP 5: ICD-10-PCS | Performed by: INTERNAL MEDICINE

## 2021-01-16 PROCEDURE — 84145 PROCALCITONIN (PCT): CPT

## 2021-01-16 PROCEDURE — 1200000000 HC SEMI PRIVATE

## 2021-01-16 PROCEDURE — 6370000000 HC RX 637 (ALT 250 FOR IP): Performed by: STUDENT IN AN ORGANIZED HEALTH CARE EDUCATION/TRAINING PROGRAM

## 2021-01-16 PROCEDURE — 86141 C-REACTIVE PROTEIN HS: CPT

## 2021-01-16 PROCEDURE — 2500000003 HC RX 250 WO HCPCS: Performed by: INTERNAL MEDICINE

## 2021-01-16 PROCEDURE — 2580000003 HC RX 258: Performed by: STUDENT IN AN ORGANIZED HEALTH CARE EDUCATION/TRAINING PROGRAM

## 2021-01-16 PROCEDURE — 86901 BLOOD TYPING SEROLOGIC RH(D): CPT

## 2021-01-16 PROCEDURE — 86850 RBC ANTIBODY SCREEN: CPT

## 2021-01-16 PROCEDURE — XW13325 TRANSFUSION OF CONVALESCENT PLASMA (NONAUTOLOGOUS) INTO PERIPHERAL VEIN, PERCUTANEOUS APPROACH, NEW TECHNOLOGY GROUP 5: ICD-10-PCS | Performed by: INTERNAL MEDICINE

## 2021-01-16 PROCEDURE — 80053 COMPREHEN METABOLIC PANEL: CPT

## 2021-01-16 RX ORDER — 0.9 % SODIUM CHLORIDE 0.9 %
30 INTRAVENOUS SOLUTION INTRAVENOUS PRN
Status: DISCONTINUED | OUTPATIENT
Start: 2021-01-16 | End: 2021-01-20 | Stop reason: HOSPADM

## 2021-01-16 RX ORDER — SODIUM CHLORIDE 9 MG/ML
INJECTION, SOLUTION INTRAVENOUS PRN
Status: DISCONTINUED | OUTPATIENT
Start: 2021-01-16 | End: 2021-01-20 | Stop reason: HOSPADM

## 2021-01-16 RX ADMIN — DONEPEZIL HYDROCHLORIDE 10 MG: 10 TABLET, FILM COATED ORAL at 22:59

## 2021-01-16 RX ADMIN — LOSARTAN POTASSIUM 25 MG: 25 TABLET, FILM COATED ORAL at 08:55

## 2021-01-16 RX ADMIN — ATORVASTATIN CALCIUM 40 MG: 40 TABLET, FILM COATED ORAL at 01:00

## 2021-01-16 RX ADMIN — PANTOPRAZOLE SODIUM 40 MG: 40 TABLET, DELAYED RELEASE ORAL at 07:10

## 2021-01-16 RX ADMIN — LEVOTHYROXINE SODIUM 50 MCG: 50 TABLET ORAL at 07:10

## 2021-01-16 RX ADMIN — LOSARTAN POTASSIUM 25 MG: 25 TABLET, FILM COATED ORAL at 01:00

## 2021-01-16 RX ADMIN — Medication 6000 UNITS: at 08:56

## 2021-01-16 RX ADMIN — SODIUM CHLORIDE, PRESERVATIVE FREE 10 ML: 5 INJECTION INTRAVENOUS at 00:57

## 2021-01-16 RX ADMIN — METFORMIN HYDROCHLORIDE 1000 MG: 500 TABLET ORAL at 08:55

## 2021-01-16 RX ADMIN — GUAIFENESIN 600 MG: 600 TABLET, EXTENDED RELEASE ORAL at 22:59

## 2021-01-16 RX ADMIN — ENOXAPARIN SODIUM 30 MG: 30 INJECTION SUBCUTANEOUS at 08:56

## 2021-01-16 RX ADMIN — GUAIFENESIN 600 MG: 600 TABLET, EXTENDED RELEASE ORAL at 01:00

## 2021-01-16 RX ADMIN — METFORMIN HYDROCHLORIDE 1000 MG: 500 TABLET ORAL at 16:48

## 2021-01-16 RX ADMIN — METHYLPREDNISOLONE SODIUM SUCCINATE 40 MG: 40 INJECTION, POWDER, FOR SOLUTION INTRAMUSCULAR; INTRAVENOUS at 23:05

## 2021-01-16 RX ADMIN — GLIPIZIDE 2.5 MG: 5 TABLET ORAL at 16:48

## 2021-01-16 RX ADMIN — REMDESIVIR 200 MG: 100 INJECTION, POWDER, LYOPHILIZED, FOR SOLUTION INTRAVENOUS at 14:08

## 2021-01-16 RX ADMIN — ATORVASTATIN CALCIUM 40 MG: 40 TABLET, FILM COATED ORAL at 08:55

## 2021-01-16 RX ADMIN — GLIPIZIDE 2.5 MG: 5 TABLET ORAL at 09:02

## 2021-01-16 RX ADMIN — DONEPEZIL HYDROCHLORIDE 10 MG: 10 TABLET, FILM COATED ORAL at 01:00

## 2021-01-16 RX ADMIN — METHYLPREDNISOLONE SODIUM SUCCINATE 40 MG: 40 INJECTION, POWDER, FOR SOLUTION INTRAMUSCULAR; INTRAVENOUS at 01:00

## 2021-01-16 RX ADMIN — GUAIFENESIN 600 MG: 600 TABLET, EXTENDED RELEASE ORAL at 08:55

## 2021-01-16 RX ADMIN — ENOXAPARIN SODIUM 30 MG: 30 INJECTION SUBCUTANEOUS at 22:59

## 2021-01-16 RX ADMIN — SODIUM CHLORIDE, PRESERVATIVE FREE 10 ML: 5 INJECTION INTRAVENOUS at 23:00

## 2021-01-16 RX ADMIN — ENOXAPARIN SODIUM 30 MG: 30 INJECTION SUBCUTANEOUS at 01:00

## 2021-01-16 RX ADMIN — SODIUM CHLORIDE, PRESERVATIVE FREE 10 ML: 5 INJECTION INTRAVENOUS at 08:56

## 2021-01-16 RX ADMIN — METHYLPREDNISOLONE SODIUM SUCCINATE 40 MG: 40 INJECTION, POWDER, FOR SOLUTION INTRAMUSCULAR; INTRAVENOUS at 08:55

## 2021-01-16 ASSESSMENT — PAIN SCALES - GENERAL: PAINLEVEL_OUTOF10: 0

## 2021-01-16 NOTE — ED NOTES
Report to [facility] 4E. Questions answered and voiced understanding.      Karla Hernández RN  01/15/21 2614

## 2021-01-16 NOTE — PROGRESS NOTES
Hospitalist Progress Note      Name:  Juan Holt /Age/Sex: 1950  (78 y.o. male)   MRN & CSN:  [id number] & [id number] Admission Date/Time: 1/15/2021 11:08 AM   Location:  [address] [place] [address] PCP: No primary care provider on file. Hospital Day: 2    Assessment and Plan:   Juan Holt is a 78 y.o.  male  who presents with COVID symptoms    1) COVID-19 pneumonia  -Requiring oxygen currently  -Continue dexamethasone  -Ordered remdesivir and convalescent plasma  -No antibiotics needed, procalcitonin is negative  -Precautions Per protocol    2) Acute respiratory failure with hypoxia due to COVID-19  -Continue supplemental oxygen  -Wean off as tolerated    Other chronic medical conditions, medication resumed unless contraindicated    -COPD not in exacerbation  -DM Type 2  -GERD  -HLD  -Essential HTN      Diet DIET GENERAL;   DVT Prophylaxis [] Lovenox, []  Heparin, [] SCDs, [] Ambulation   GI Prophylaxis [] PPI,  [] H2 Blocker,  [] Carafate,  [] Diet/Tube Feeds   Code Status Full Code   Disposition Home   MDM      History of Present Illness:     Patient was seen and examined  Denies any worsening shortness of breath  No fever or chills  No dizziness or palpitations    Ten point ROS reviewed negative, unless as noted above    Objective: Intake/Output Summary (Last 24 hours) at 2021 1641  Last data filed at 2021 0110  Gross per 24 hour   Intake 130 ml   Output --   Net 130 ml      Vitals:   Vitals:    21 1601   BP: 115/69   Pulse: 61   Resp: 18   Temp: 97.8 °F (36.6 °C)   SpO2: 96%     Physical Exam:   GEN Awake male, sitting upright in bed in no apparent distress. Appears given age. EYES Pupils are equally round. No scleral erythema, discharge, or conjunctivitis. HENT Mucous membranes are moist. Oral pharynx without exudates, no evidence of thrush. NECK Supple, no apparent thyromegaly or masses. RESP Clear to auscultation, no wheezes, rales or rhonchi.   Symmetric chest movement while on 2 L of O2.  CARDIO/VASC S1/S2 auscultated. Regular rate without appreciable murmurs, rubs, or gallops. No JVD or carotid bruits. Peripheral pulses equal bilaterally and palpable. No peripheral edema. GI Abdomen is soft without significant tenderness, masses, or guarding. Bowel sounds are normoactive. Rectal exam deferred.  No costovertebral angle tenderness. Normal appearing external genitalia. Alarcon catheter is not present. HEME/LYMPH No palpable cervical lymphadenopathy and no hepatosplenomegaly. No petechiae or ecchymoses. MSK No gross joint deformities. SKIN Normal coloration, warm, dry. NEURO Cranial nerves appear grossly intact, normal speech, no lateralizing weakness. PSYCH Awake, alert, oriented x 4. Affect appropriate.     Medications:   Medications:    [START ON 1/17/2021] remdesivir IVPB  100 mg Intravenous Q24H    atorvastatin  40 mg Oral Daily    donepezil  10 mg Oral Nightly    glipiZIDE  2.5 mg Oral BID AC    levothyroxine  50 mcg Oral Daily    losartan  25 mg Oral Daily    metFORMIN  1,000 mg Oral BID WC    pantoprazole  40 mg Oral QAM AC    sodium chloride flush  10 mL Intravenous 2 times per day    enoxaparin  30 mg Subcutaneous BID    methylPREDNISolone  40 mg Intravenous Q12H    vitamin D  6,000 Units Oral Daily    Followed by   Suzanne De La O ON 1/22/2021] vitamin D  2,000 Units Oral Daily    guaiFENesin  600 mg Oral BID      Infusions:    sodium chloride      sodium chloride       PRN Meds:     sodium chloride, , PRN      sodium chloride, 30 mL, PRN      HYDROcodone-acetaminophen, 1 tablet, Q4H PRN      sodium chloride, , PRN      sodium chloride flush, 10 mL, PRN      promethazine, 12.5 mg, Q6H PRN    Or      ondansetron, 4 mg, Q6H PRN      acetaminophen, 650 mg, Q6H PRN    Or      acetaminophen, 650 mg, Q6H PRN      bisacodyl, 5 mg, Daily PRN          Electronically signed by Jojo Mcgraw MD on 1/16/2021 at 4:41 PM

## 2021-01-16 NOTE — CONSULTS
Pharmacy Consult for Remdesivir Initiation per Dr. Silvia Gleason for Use:  Covid (+) - Yes  Requiring supplemental oxygen - Yes  Requiring high-flow oxygen (>15L/min), non-invasive (BiPAP), or invasive mechanical ventilation - No  Use of 1 pressor or less - Yes  EGFR >30mL/min - Yes  ALT <5x ULN - Yes    Recommendations are consistent with the [facility] criteria for use published on the Hub with consideration from multiple clinical trials. https://hub.[facility]. org/sites/Quality/COVID-19/Clinical%20Care/Pharmaceutical/[facility]%20COVID%2019%20Treatment%20Summary%20-%20Hub%20Edition. pdf    Based on the above criteria, the patient is a candidate for remdesivir therapy. Remdesivir 200 mg on day 1 followed by 100 mg daily on days 2-5. The following test will also be ordered:  BMP x5 days  CBC x5 days  LFT x5 days    RENAL LAB EVALUATION  Estimated Creatinine Clearance: 101 mL/min (A) (based on SCr of 0.7 mg/dL (L)). Recent Labs     01/15/21  1135 01/16/21  0213   BUN 16 14   CREATININE 0.8* 0.7*       LIVER FUNCTION LAB EVALUATION  Recent Labs     01/16/21  0213   AST 25   ALT 29   ALKPHOS 55   BILITOT 0.2       PLATELETS  Platelets   Date Value Ref Range Status   01/15/2021 168 140 - 440 K/CU MM Final     Thank you for the consult,    Refugio Chino.  Roxanne Gutierrez  1/16/2021 @ 10:59 AM

## 2021-01-17 LAB
ALBUMIN SERPL-MCNC: 3.5 GM/DL (ref 3.4–5)
ALP BLD-CCNC: 49 IU/L (ref 40–129)
ALT SERPL-CCNC: 22 U/L (ref 10–40)
ANION GAP SERPL CALCULATED.3IONS-SCNC: 12 MMOL/L (ref 4–16)
AST SERPL-CCNC: 20 IU/L (ref 15–37)
BASOPHILS ABSOLUTE: 0 K/CU MM
BASOPHILS RELATIVE PERCENT: 0 % (ref 0–1)
BILIRUB SERPL-MCNC: 0.2 MG/DL (ref 0–1)
BUN BLDV-MCNC: 24 MG/DL (ref 6–23)
CALCIUM SERPL-MCNC: 8.3 MG/DL (ref 8.3–10.6)
CHLORIDE BLD-SCNC: 100 MMOL/L (ref 99–110)
CO2: 20 MMOL/L (ref 21–32)
CREAT SERPL-MCNC: 0.8 MG/DL (ref 0.9–1.3)
D DIMER: <200 NG/ML(DDU)
DIFFERENTIAL TYPE: ABNORMAL
EOSINOPHILS ABSOLUTE: 0 K/CU MM
EOSINOPHILS RELATIVE PERCENT: 0 % (ref 0–3)
ESTIMATED AVERAGE GLUCOSE: 154 MG/DL
FIBRINOGEN LEVEL: 331 MG/DL (ref 196.9–442.1)
GFR AFRICAN AMERICAN: >60 ML/MIN/1.73M2
GFR NON-AFRICAN AMERICAN: >60 ML/MIN/1.73M2
GLUCOSE BLD-MCNC: 139 MG/DL (ref 70–99)
GLUCOSE BLD-MCNC: 143 MG/DL (ref 70–99)
GLUCOSE BLD-MCNC: 166 MG/DL (ref 70–99)
GLUCOSE BLD-MCNC: 197 MG/DL (ref 70–99)
HBA1C MFR BLD: 7 % (ref 4.2–6.3)
HCT VFR BLD CALC: 38.1 % (ref 42–52)
HEMOGLOBIN: 12.1 GM/DL (ref 13.5–18)
IMMATURE NEUTROPHIL %: 0.4 % (ref 0–0.43)
LYMPHOCYTES ABSOLUTE: 0.4 K/CU MM
LYMPHOCYTES RELATIVE PERCENT: 8 % (ref 24–44)
MCH RBC QN AUTO: 30.1 PG (ref 27–31)
MCHC RBC AUTO-ENTMCNC: 31.8 % (ref 32–36)
MCV RBC AUTO: 94.8 FL (ref 78–100)
MONOCYTES ABSOLUTE: 0.4 K/CU MM
MONOCYTES RELATIVE PERCENT: 8.7 % (ref 0–4)
NUCLEATED RBC %: 0 %
PDW BLD-RTO: 14.1 % (ref 11.7–14.9)
PLATELET # BLD: 172 K/CU MM (ref 140–440)
PMV BLD AUTO: 11.2 FL (ref 7.5–11.1)
POTASSIUM SERPL-SCNC: 4.3 MMOL/L (ref 3.5–5.1)
RBC # BLD: 4.02 M/CU MM (ref 4.6–6.2)
SEGMENTED NEUTROPHILS ABSOLUTE COUNT: 3.9 K/CU MM
SEGMENTED NEUTROPHILS RELATIVE PERCENT: 82.9 % (ref 36–66)
SODIUM BLD-SCNC: 132 MMOL/L (ref 135–145)
TOTAL IMMATURE NEUTOROPHIL: 0.02 K/CU MM
TOTAL NUCLEATED RBC: 0 K/CU MM
TOTAL PROTEIN: 5.8 GM/DL (ref 6.4–8.2)
WBC # BLD: 4.7 K/CU MM (ref 4–10.5)

## 2021-01-17 PROCEDURE — 36415 COLL VENOUS BLD VENIPUNCTURE: CPT

## 2021-01-17 PROCEDURE — 1200000000 HC SEMI PRIVATE

## 2021-01-17 PROCEDURE — 6360000002 HC RX W HCPCS: Performed by: STUDENT IN AN ORGANIZED HEALTH CARE EDUCATION/TRAINING PROGRAM

## 2021-01-17 PROCEDURE — 85025 COMPLETE CBC W/AUTO DIFF WBC: CPT

## 2021-01-17 PROCEDURE — 85384 FIBRINOGEN ACTIVITY: CPT

## 2021-01-17 PROCEDURE — 94761 N-INVAS EAR/PLS OXIMETRY MLT: CPT

## 2021-01-17 PROCEDURE — 6370000000 HC RX 637 (ALT 250 FOR IP): Performed by: STUDENT IN AN ORGANIZED HEALTH CARE EDUCATION/TRAINING PROGRAM

## 2021-01-17 PROCEDURE — 85379 FIBRIN DEGRADATION QUANT: CPT

## 2021-01-17 PROCEDURE — 2500000003 HC RX 250 WO HCPCS: Performed by: INTERNAL MEDICINE

## 2021-01-17 PROCEDURE — 82962 GLUCOSE BLOOD TEST: CPT

## 2021-01-17 PROCEDURE — 2580000003 HC RX 258: Performed by: STUDENT IN AN ORGANIZED HEALTH CARE EDUCATION/TRAINING PROGRAM

## 2021-01-17 PROCEDURE — 83036 HEMOGLOBIN GLYCOSYLATED A1C: CPT

## 2021-01-17 PROCEDURE — 2580000003 HC RX 258: Performed by: INTERNAL MEDICINE

## 2021-01-17 PROCEDURE — 2700000000 HC OXYGEN THERAPY PER DAY

## 2021-01-17 PROCEDURE — 80053 COMPREHEN METABOLIC PANEL: CPT

## 2021-01-17 RX ADMIN — LOSARTAN POTASSIUM 25 MG: 25 TABLET, FILM COATED ORAL at 08:50

## 2021-01-17 RX ADMIN — METFORMIN HYDROCHLORIDE 1000 MG: 500 TABLET ORAL at 08:50

## 2021-01-17 RX ADMIN — SODIUM CHLORIDE, PRESERVATIVE FREE 10 ML: 5 INJECTION INTRAVENOUS at 08:53

## 2021-01-17 RX ADMIN — LEVOTHYROXINE SODIUM 50 MCG: 50 TABLET ORAL at 06:44

## 2021-01-17 RX ADMIN — SODIUM CHLORIDE, PRESERVATIVE FREE 10 ML: 5 INJECTION INTRAVENOUS at 20:11

## 2021-01-17 RX ADMIN — GLIPIZIDE 2.5 MG: 5 TABLET ORAL at 09:01

## 2021-01-17 RX ADMIN — METHYLPREDNISOLONE SODIUM SUCCINATE 40 MG: 40 INJECTION, POWDER, FOR SOLUTION INTRAMUSCULAR; INTRAVENOUS at 20:09

## 2021-01-17 RX ADMIN — GUAIFENESIN 600 MG: 600 TABLET, EXTENDED RELEASE ORAL at 08:50

## 2021-01-17 RX ADMIN — Medication 6000 UNITS: at 08:51

## 2021-01-17 RX ADMIN — DONEPEZIL HYDROCHLORIDE 10 MG: 10 TABLET, FILM COATED ORAL at 20:11

## 2021-01-17 RX ADMIN — ATORVASTATIN CALCIUM 40 MG: 40 TABLET, FILM COATED ORAL at 08:51

## 2021-01-17 RX ADMIN — ENOXAPARIN SODIUM 30 MG: 30 INJECTION SUBCUTANEOUS at 20:10

## 2021-01-17 RX ADMIN — REMDESIVIR 100 MG: 100 INJECTION, POWDER, LYOPHILIZED, FOR SOLUTION INTRAVENOUS at 17:17

## 2021-01-17 RX ADMIN — METFORMIN HYDROCHLORIDE 1000 MG: 500 TABLET ORAL at 17:17

## 2021-01-17 RX ADMIN — GLIPIZIDE 2.5 MG: 5 TABLET ORAL at 17:17

## 2021-01-17 RX ADMIN — ENOXAPARIN SODIUM 30 MG: 30 INJECTION SUBCUTANEOUS at 08:51

## 2021-01-17 RX ADMIN — METHYLPREDNISOLONE SODIUM SUCCINATE 40 MG: 40 INJECTION, POWDER, FOR SOLUTION INTRAMUSCULAR; INTRAVENOUS at 08:51

## 2021-01-17 RX ADMIN — GUAIFENESIN 600 MG: 600 TABLET, EXTENDED RELEASE ORAL at 20:10

## 2021-01-17 RX ADMIN — PANTOPRAZOLE SODIUM 40 MG: 40 TABLET, DELAYED RELEASE ORAL at 06:44

## 2021-01-17 RX ADMIN — ACETAMINOPHEN 650 MG: 325 TABLET ORAL at 03:33

## 2021-01-17 ASSESSMENT — PAIN SCALES - GENERAL
PAINLEVEL_OUTOF10: 0
PAINLEVEL_OUTOF10: 0

## 2021-01-17 ASSESSMENT — PAIN DESCRIPTION - LOCATION: LOCATION: HEAD

## 2021-01-17 ASSESSMENT — PAIN DESCRIPTION - ONSET: ONSET: PROGRESSIVE

## 2021-01-17 NOTE — PROGRESS NOTES
Right eye: No discharge. Left eye: No discharge. Pulmonary:      Effort: Pulmonary effort is normal.   Skin:     Coloration: Skin is not jaundiced. Neurological:      Mental Status: He is alert. Cranial Nerves: No cranial nerve deficit.    Psychiatric:         Mood and Affect: Mood normal.           Medications:   Medications:    remdesivir IVPB  100 mg Intravenous Q24H    atorvastatin  40 mg Oral Daily    donepezil  10 mg Oral Nightly    glipiZIDE  2.5 mg Oral BID AC    levothyroxine  50 mcg Oral Daily    losartan  25 mg Oral Daily    metFORMIN  1,000 mg Oral BID WC    pantoprazole  40 mg Oral QAM AC    sodium chloride flush  10 mL Intravenous 2 times per day    enoxaparin  30 mg Subcutaneous BID    methylPREDNISolone  40 mg Intravenous Q12H    vitamin D  6,000 Units Oral Daily    Followed by   Adelaide Nance ON 1/22/2021] vitamin D  2,000 Units Oral Daily    guaiFENesin  600 mg Oral BID      Infusions:    sodium chloride      sodium chloride       PRN Meds:     sodium chloride, , PRN      sodium chloride, 30 mL, PRN      HYDROcodone-acetaminophen, 1 tablet, Q4H PRN      sodium chloride, , PRN      sodium chloride flush, 10 mL, PRN      promethazine, 12.5 mg, Q6H PRN    Or      ondansetron, 4 mg, Q6H PRN      acetaminophen, 650 mg, Q6H PRN    Or      acetaminophen, 650 mg, Q6H PRN      bisacodyl, 5 mg, Daily PRN          Electronically signed by Vicky Kate MD on 1/17/2021 at 10:53 AM

## 2021-01-18 LAB
ALBUMIN SERPL-MCNC: 3.3 GM/DL (ref 3.4–5)
ALP BLD-CCNC: 48 IU/L (ref 40–128)
ALT SERPL-CCNC: 22 U/L (ref 10–40)
ANION GAP SERPL CALCULATED.3IONS-SCNC: 13 MMOL/L (ref 4–16)
AST SERPL-CCNC: 20 IU/L (ref 15–37)
BASOPHILS ABSOLUTE: 0 K/CU MM
BASOPHILS RELATIVE PERCENT: 0 % (ref 0–1)
BILIRUB SERPL-MCNC: 0.2 MG/DL (ref 0–1)
BUN BLDV-MCNC: 24 MG/DL (ref 6–23)
C-REACTIVE PROTEIN, HIGH SENSITIVITY: 12.7 MG/L
CALCIUM SERPL-MCNC: 8.1 MG/DL (ref 8.3–10.6)
CHLORIDE BLD-SCNC: 102 MMOL/L (ref 99–110)
CO2: 19 MMOL/L (ref 21–32)
COMPONENT: NORMAL
CREAT SERPL-MCNC: 0.7 MG/DL (ref 0.9–1.3)
D DIMER: 248 NG/ML(DDU)
DIFFERENTIAL TYPE: ABNORMAL
EOSINOPHILS ABSOLUTE: 0 K/CU MM
EOSINOPHILS RELATIVE PERCENT: 0 % (ref 0–3)
FIBRINOGEN LEVEL: 368 MG/DL (ref 196.9–442.1)
GFR AFRICAN AMERICAN: >60 ML/MIN/1.73M2
GFR NON-AFRICAN AMERICAN: >60 ML/MIN/1.73M2
GLUCOSE BLD-MCNC: 106 MG/DL (ref 70–99)
GLUCOSE BLD-MCNC: 144 MG/DL (ref 70–99)
GLUCOSE BLD-MCNC: 157 MG/DL (ref 70–99)
GLUCOSE BLD-MCNC: 92 MG/DL (ref 70–99)
HCT VFR BLD CALC: 37 % (ref 42–52)
HEMOGLOBIN: 11.8 GM/DL (ref 13.5–18)
IMMATURE NEUTROPHIL %: 0.4 % (ref 0–0.43)
LYMPHOCYTES ABSOLUTE: 0.3 K/CU MM
LYMPHOCYTES RELATIVE PERCENT: 6.2 % (ref 24–44)
Lab: 2
MCH RBC QN AUTO: 30.5 PG (ref 27–31)
MCHC RBC AUTO-ENTMCNC: 31.9 % (ref 32–36)
MCV RBC AUTO: 95.6 FL (ref 78–100)
MONOCYTES ABSOLUTE: 0.5 K/CU MM
MONOCYTES RELATIVE PERCENT: 8.8 % (ref 0–4)
NUCLEATED RBC %: 0 %
PDW BLD-RTO: 14.3 % (ref 11.7–14.9)
PLATELET # BLD: 155 K/CU MM (ref 140–440)
PMV BLD AUTO: 11.1 FL (ref 7.5–11.1)
POTASSIUM SERPL-SCNC: 4.6 MMOL/L (ref 3.5–5.1)
PROCALCITONIN: 0.07
RBC # BLD: 3.87 M/CU MM (ref 4.6–6.2)
SEGMENTED NEUTROPHILS ABSOLUTE COUNT: 4.4 K/CU MM
SEGMENTED NEUTROPHILS RELATIVE PERCENT: 84.6 % (ref 36–66)
SODIUM BLD-SCNC: 134 MMOL/L (ref 135–145)
STATUS: NORMAL
STATUS: NORMAL
TOTAL IMMATURE NEUTOROPHIL: 0.02 K/CU MM
TOTAL NUCLEATED RBC: 0 K/CU MM
TOTAL PROTEIN: 5.9 GM/DL (ref 6.4–8.2)
TRANSFUSION STATUS: NORMAL
TRANSFUSION STATUS: NORMAL
UNIT DIVISION: NORMAL
UNIT DIVISION: NORMAL
UNIT NUMBER: NORMAL
UNIT NUMBER: NORMAL
WBC # BLD: 5.1 K/CU MM (ref 4–10.5)

## 2021-01-18 PROCEDURE — 80053 COMPREHEN METABOLIC PANEL: CPT

## 2021-01-18 PROCEDURE — 94761 N-INVAS EAR/PLS OXIMETRY MLT: CPT

## 2021-01-18 PROCEDURE — 85379 FIBRIN DEGRADATION QUANT: CPT

## 2021-01-18 PROCEDURE — 6370000000 HC RX 637 (ALT 250 FOR IP): Performed by: STUDENT IN AN ORGANIZED HEALTH CARE EDUCATION/TRAINING PROGRAM

## 2021-01-18 PROCEDURE — 36415 COLL VENOUS BLD VENIPUNCTURE: CPT

## 2021-01-18 PROCEDURE — 2580000003 HC RX 258: Performed by: STUDENT IN AN ORGANIZED HEALTH CARE EDUCATION/TRAINING PROGRAM

## 2021-01-18 PROCEDURE — 2500000003 HC RX 250 WO HCPCS: Performed by: INTERNAL MEDICINE

## 2021-01-18 PROCEDURE — 1200000000 HC SEMI PRIVATE

## 2021-01-18 PROCEDURE — 6370000000 HC RX 637 (ALT 250 FOR IP): Performed by: INTERNAL MEDICINE

## 2021-01-18 PROCEDURE — 84145 PROCALCITONIN (PCT): CPT

## 2021-01-18 PROCEDURE — 6360000002 HC RX W HCPCS: Performed by: STUDENT IN AN ORGANIZED HEALTH CARE EDUCATION/TRAINING PROGRAM

## 2021-01-18 PROCEDURE — 86141 C-REACTIVE PROTEIN HS: CPT

## 2021-01-18 PROCEDURE — 83036 HEMOGLOBIN GLYCOSYLATED A1C: CPT

## 2021-01-18 PROCEDURE — 2700000000 HC OXYGEN THERAPY PER DAY

## 2021-01-18 PROCEDURE — 86140 C-REACTIVE PROTEIN: CPT

## 2021-01-18 PROCEDURE — 2580000003 HC RX 258: Performed by: INTERNAL MEDICINE

## 2021-01-18 PROCEDURE — 82962 GLUCOSE BLOOD TEST: CPT

## 2021-01-18 PROCEDURE — 85025 COMPLETE CBC W/AUTO DIFF WBC: CPT

## 2021-01-18 PROCEDURE — 85384 FIBRINOGEN ACTIVITY: CPT

## 2021-01-18 RX ORDER — HYDROCODONE POLISTIREX AND CHLORPHENIRAMINE POLISTIREX 10; 8 MG/5ML; MG/5ML
5 SUSPENSION, EXTENDED RELEASE ORAL ONCE
Status: COMPLETED | OUTPATIENT
Start: 2021-01-18 | End: 2021-01-18

## 2021-01-18 RX ORDER — GUAIFENESIN AND CODEINE PHOSPHATE 100; 10 MG/5ML; MG/5ML
5 SOLUTION ORAL EVERY 4 HOURS PRN
Status: DISCONTINUED | OUTPATIENT
Start: 2021-01-18 | End: 2021-01-20 | Stop reason: HOSPADM

## 2021-01-18 RX ORDER — BENZONATATE 100 MG/1
100 CAPSULE ORAL 3 TIMES DAILY
Status: DISCONTINUED | OUTPATIENT
Start: 2021-01-18 | End: 2021-01-20 | Stop reason: HOSPADM

## 2021-01-18 RX ADMIN — DONEPEZIL HYDROCHLORIDE 10 MG: 10 TABLET, FILM COATED ORAL at 21:02

## 2021-01-18 RX ADMIN — ENOXAPARIN SODIUM 30 MG: 30 INJECTION SUBCUTANEOUS at 09:33

## 2021-01-18 RX ADMIN — METFORMIN HYDROCHLORIDE 1000 MG: 500 TABLET ORAL at 09:33

## 2021-01-18 RX ADMIN — REMDESIVIR 100 MG: 100 INJECTION, POWDER, LYOPHILIZED, FOR SOLUTION INTRAVENOUS at 18:00

## 2021-01-18 RX ADMIN — ATORVASTATIN CALCIUM 40 MG: 40 TABLET, FILM COATED ORAL at 09:34

## 2021-01-18 RX ADMIN — GUAIFENESIN 600 MG: 600 TABLET, EXTENDED RELEASE ORAL at 09:34

## 2021-01-18 RX ADMIN — ENOXAPARIN SODIUM 30 MG: 30 INJECTION SUBCUTANEOUS at 21:01

## 2021-01-18 RX ADMIN — LOSARTAN POTASSIUM 25 MG: 25 TABLET, FILM COATED ORAL at 09:34

## 2021-01-18 RX ADMIN — METHYLPREDNISOLONE SODIUM SUCCINATE 40 MG: 40 INJECTION, POWDER, FOR SOLUTION INTRAMUSCULAR; INTRAVENOUS at 09:34

## 2021-01-18 RX ADMIN — METHYLPREDNISOLONE SODIUM SUCCINATE 40 MG: 40 INJECTION, POWDER, FOR SOLUTION INTRAMUSCULAR; INTRAVENOUS at 21:01

## 2021-01-18 RX ADMIN — GLIPIZIDE 2.5 MG: 5 TABLET ORAL at 06:08

## 2021-01-18 RX ADMIN — LEVOTHYROXINE SODIUM 50 MCG: 50 TABLET ORAL at 06:08

## 2021-01-18 RX ADMIN — GUAIFENESIN 600 MG: 600 TABLET, EXTENDED RELEASE ORAL at 21:02

## 2021-01-18 RX ADMIN — METFORMIN HYDROCHLORIDE 1000 MG: 500 TABLET ORAL at 17:50

## 2021-01-18 RX ADMIN — Medication 6000 UNITS: at 09:33

## 2021-01-18 RX ADMIN — GUAIFENESIN AND CODEINE PHOSPHATE 5 ML: 100; 10 SOLUTION ORAL at 17:50

## 2021-01-18 RX ADMIN — SODIUM CHLORIDE, PRESERVATIVE FREE 10 ML: 5 INJECTION INTRAVENOUS at 09:35

## 2021-01-18 RX ADMIN — PANTOPRAZOLE SODIUM 40 MG: 40 TABLET, DELAYED RELEASE ORAL at 06:08

## 2021-01-18 RX ADMIN — BENZONATATE 100 MG: 100 CAPSULE ORAL at 21:02

## 2021-01-18 RX ADMIN — SODIUM CHLORIDE, PRESERVATIVE FREE 10 ML: 5 INJECTION INTRAVENOUS at 21:01

## 2021-01-18 RX ADMIN — GLIPIZIDE 2.5 MG: 5 TABLET ORAL at 17:54

## 2021-01-18 RX ADMIN — Medication 5 ML: at 21:05

## 2021-01-18 ASSESSMENT — PAIN SCALES - GENERAL
PAINLEVEL_OUTOF10: 0

## 2021-01-18 NOTE — CARE COORDINATION
Attempted to call pt in the room with no answer: per chart review pt lives at home with spouse, recently moved to [facility] from [facility]. [facility], Dr. Madeline Elmore is pcp. Pt has active insurance/ ind with ADLs and on home 02 2L. No CM needs id'd at this time, please consult Teresita Sandoval if specific needs arise.

## 2021-01-18 NOTE — PROGRESS NOTES
Hospitalist Progress Note      Name:  Jany Alarcon /Age/Sex: 1950  (78 y.o. male)   MRN & CSN:  [id number] & [id number] Admission Date/Time: 1/15/2021 11:08 AM   Location:  5805/4730-X PCP: No primary care provider on file.        Hospital Day: 4      Assessment and Plan:       -Today is hospital day 3  -he is on 2 L oxygen at home, this is his current setting today I is 2 to 2.5 L   -he is on day 2 remdesivir today  -he is very congested - work on airway clearance techniques         1) COVID-19 pneumonia  -Requiring oxygen currently  -Continue dexamethasone  -Continue remdesivir  -Convalescent plasma  -No antibiotics needed, procalcitonin is negative     2) Acute respiratory failure with hypoxia due to COVID-19  -Continue oxygen therapy    Pulmonary congestion - airway clearance therapy (ACT) with positive expiratory pressure therapy (PEP) device requested, discussed with RT  -Patient encouraged to cough secretions    Chronic respiratory failure with hypoxia-he is on home oxygen     Other chronic medical conditions, medication resumed unless contraindicated     -COPD not in exacerbation  -DM Type 2 -continue insulin  -GERD  -HLD  -Essential HTN -continue to monitor blood pressure      Abdominal pain  -Noted -and has improved on       Social hx  -Home O2 -  2 L  -Has Asthma and COPD  -Pulmonologist: none here he said  -PCP: Dr. Kimberli Mares - all his medical records are in the [place] system, he moved here from near [place], [place] 8 months ago because his wife wanted to be closer to her family  -Specialists: occasionally goes to the [place] [address] to see specialists  -Tobacco: quit 20 years ago  -Last admission: 2 years ago he said  -Diabetic for about 10 years    Subjective:      - he has a bad cough today, Tussionex and Tessalon       -Symptoms onset was -  -He reports that he took oxygen off yesterday and then became very short of breath    Objective: Intake/Output Summary (Last 24 hours) at 1/18/2021 0813  Last data filed at 1/17/2021 1857  Gross per 24 hour   Intake 850 ml   Output 350 ml   Net 500 ml      Vitals:   Vitals:    01/18/21 0423   BP: 123/72   Pulse: 57   Resp: 27   Temp: 98 °F (36.7 °C)   SpO2: 92%     Physical Exam:      Physical Exam  HENT:      Nose: No rhinorrhea. Eyes:      General:         Right eye: No discharge. Left eye: No discharge. Skin:     Coloration: Skin is not jaundiced. Neurological:      Mental Status: He is alert. Cranial Nerves: No cranial nerve deficit.    Psychiatric:         Mood and Affect: Mood normal.       He has a forceful and congested cough    Medications:   Medications:    remdesivir IVPB  100 mg Intravenous Q24H    atorvastatin  40 mg Oral Daily    donepezil  10 mg Oral Nightly    glipiZIDE  2.5 mg Oral BID AC    levothyroxine  50 mcg Oral Daily    losartan  25 mg Oral Daily    metFORMIN  1,000 mg Oral BID WC    pantoprazole  40 mg Oral QAM AC    sodium chloride flush  10 mL Intravenous 2 times per day    enoxaparin  30 mg Subcutaneous BID    methylPREDNISolone  40 mg Intravenous Q12H    vitamin D  6,000 Units Oral Daily    Followed by   Jef Cedillo ON 1/22/2021] vitamin D  2,000 Units Oral Daily    guaiFENesin  600 mg Oral BID      Infusions:    sodium chloride      sodium chloride       PRN Meds:     sodium chloride, , PRN      sodium chloride, 30 mL, PRN      HYDROcodone-acetaminophen, 1 tablet, Q4H PRN      sodium chloride, , PRN      sodium chloride flush, 10 mL, PRN      promethazine, 12.5 mg, Q6H PRN    Or      ondansetron, 4 mg, Q6H PRN      acetaminophen, 650 mg, Q6H PRN    Or      acetaminophen, 650 mg, Q6H PRN      bisacodyl, 5 mg, Daily PRN          Electronically signed by Sofi Santamaria MD on 1/18/2021 at 8:13 AM

## 2021-01-18 NOTE — PLAN OF CARE
Problem: Falls - Risk of:  Goal: Will remain free from falls  Description: Will remain free from falls  Outcome: Ongoing  Goal: Absence of physical injury  Description: Absence of physical injury  Outcome: Ongoing     Problem: Airway Clearance - Ineffective  Goal: Achieve or maintain patent airway  Outcome: Ongoing     Problem: Gas Exchange - Impaired  Goal: Absence of hypoxia  Outcome: Ongoing  Goal: Promote optimal lung function  Outcome: Ongoing     Problem: Breathing Pattern - Ineffective  Goal: Ability to achieve and maintain a regular respiratory rate  Outcome: Ongoing     Problem:  Body Temperature -  Risk of, Imbalanced  Goal: Ability to maintain a body temperature within defined limits  Outcome: Ongoing  Goal: Will regain or maintain usual level of consciousness  Outcome: Ongoing  Goal: Complications related to the disease process, condition or treatment will be avoided or minimized  Outcome: Ongoing     Problem: Isolation Precautions - Risk of Spread of Infection  Goal: Prevent transmission of infection  Outcome: Ongoing     Problem: Nutrition Deficits  Goal: Optimize nutritional status  Outcome: Ongoing     Problem: Risk for Fluid Volume Deficit  Goal: Maintain normal heart rhythm  Outcome: Ongoing  Goal: Maintain absence of muscle cramping  Outcome: Ongoing  Goal: Maintain normal serum potassium, sodium, calcium, phosphorus, and pH  Outcome: Ongoing     Problem: Loneliness or Risk for Loneliness  Goal: Demonstrate positive use of time alone when socialization is not possible  Outcome: Ongoing     Problem: Fatigue  Goal: Verbalize increase energy and improved vitality  Outcome: Ongoing     Problem: Patient Education: Go to Patient Education Activity  Goal: Patient/Family Education  Outcome: Ongoing     Problem: Pain:  Goal: Pain level will decrease  Description: Pain level will decrease  Outcome: Ongoing  Goal: Control of acute pain  Description: Control of acute pain  Outcome: Ongoing  Goal: Control of chronic pain  Description: Control of chronic pain  Outcome: Ongoing

## 2021-01-18 NOTE — PROGRESS NOTES
Hospitalist    January 18  -Today is hospital day 3  -he is on 2 L oxygen at home, and is on 2.5 L today  -he is on day 3 remdesivir today  -he gets care through the [place]

## 2021-01-19 LAB
ALBUMIN SERPL-MCNC: 3.3 GM/DL (ref 3.4–5)
ALP BLD-CCNC: 46 IU/L (ref 40–129)
ALT SERPL-CCNC: 21 U/L (ref 10–40)
ANION GAP SERPL CALCULATED.3IONS-SCNC: 13 MMOL/L (ref 4–16)
AST SERPL-CCNC: 20 IU/L (ref 15–37)
BASOPHILS ABSOLUTE: 0 K/CU MM
BASOPHILS RELATIVE PERCENT: 0 % (ref 0–1)
BILIRUB SERPL-MCNC: 0.3 MG/DL (ref 0–1)
BUN BLDV-MCNC: 24 MG/DL (ref 6–23)
CALCIUM SERPL-MCNC: 8.1 MG/DL (ref 8.3–10.6)
CHLORIDE BLD-SCNC: 100 MMOL/L (ref 99–110)
CO2: 22 MMOL/L (ref 21–32)
CREAT SERPL-MCNC: 0.8 MG/DL (ref 0.9–1.3)
D DIMER: <200 NG/ML(DDU)
DIFFERENTIAL TYPE: ABNORMAL
EOSINOPHILS ABSOLUTE: 0 K/CU MM
EOSINOPHILS RELATIVE PERCENT: 0 % (ref 0–3)
FIBRINOGEN LEVEL: 388 MG/DL (ref 196.9–442.1)
GFR AFRICAN AMERICAN: >60 ML/MIN/1.73M2
GFR NON-AFRICAN AMERICAN: >60 ML/MIN/1.73M2
GLUCOSE BLD-MCNC: 133 MG/DL (ref 70–99)
GLUCOSE BLD-MCNC: 140 MG/DL (ref 70–99)
GLUCOSE BLD-MCNC: 153 MG/DL (ref 70–99)
GLUCOSE BLD-MCNC: 99 MG/DL (ref 70–99)
HCT VFR BLD CALC: 36.9 % (ref 42–52)
HEMOGLOBIN: 11.7 GM/DL (ref 13.5–18)
IMMATURE NEUTROPHIL %: 0.5 % (ref 0–0.43)
LV EF: 53 %
LVEF MODALITY: NORMAL
LYMPHOCYTES ABSOLUTE: 0.4 K/CU MM
LYMPHOCYTES RELATIVE PERCENT: 10.2 % (ref 24–44)
MCH RBC QN AUTO: 30.2 PG (ref 27–31)
MCHC RBC AUTO-ENTMCNC: 31.7 % (ref 32–36)
MCV RBC AUTO: 95.1 FL (ref 78–100)
MONOCYTES ABSOLUTE: 0.5 K/CU MM
MONOCYTES RELATIVE PERCENT: 12.3 % (ref 0–4)
NUCLEATED RBC %: 0 %
PDW BLD-RTO: 14.2 % (ref 11.7–14.9)
PLATELET # BLD: 152 K/CU MM (ref 140–440)
PMV BLD AUTO: 11.2 FL (ref 7.5–11.1)
POTASSIUM SERPL-SCNC: 4.4 MMOL/L (ref 3.5–5.1)
RAPID INFLUENZA  B AGN: NEGATIVE
RAPID INFLUENZA A AGN: NEGATIVE
RBC # BLD: 3.88 M/CU MM (ref 4.6–6.2)
SEGMENTED NEUTROPHILS ABSOLUTE COUNT: 3 K/CU MM
SEGMENTED NEUTROPHILS RELATIVE PERCENT: 77 % (ref 36–66)
SODIUM BLD-SCNC: 135 MMOL/L (ref 135–145)
TOTAL IMMATURE NEUTOROPHIL: 0.02 K/CU MM
TOTAL NUCLEATED RBC: 0 K/CU MM
TOTAL PROTEIN: 5.5 GM/DL (ref 6.4–8.2)
TSH HIGH SENSITIVITY: 0.77 UIU/ML (ref 0.27–4.2)
WBC # BLD: 3.9 K/CU MM (ref 4–10.5)

## 2021-01-19 PROCEDURE — 6370000000 HC RX 637 (ALT 250 FOR IP): Performed by: STUDENT IN AN ORGANIZED HEALTH CARE EDUCATION/TRAINING PROGRAM

## 2021-01-19 PROCEDURE — 6360000002 HC RX W HCPCS: Performed by: STUDENT IN AN ORGANIZED HEALTH CARE EDUCATION/TRAINING PROGRAM

## 2021-01-19 PROCEDURE — 93306 TTE W/DOPPLER COMPLETE: CPT

## 2021-01-19 PROCEDURE — 82962 GLUCOSE BLOOD TEST: CPT

## 2021-01-19 PROCEDURE — 2580000003 HC RX 258: Performed by: STUDENT IN AN ORGANIZED HEALTH CARE EDUCATION/TRAINING PROGRAM

## 2021-01-19 PROCEDURE — 80053 COMPREHEN METABOLIC PANEL: CPT

## 2021-01-19 PROCEDURE — 84443 ASSAY THYROID STIM HORMONE: CPT

## 2021-01-19 PROCEDURE — 6370000000 HC RX 637 (ALT 250 FOR IP): Performed by: INTERNAL MEDICINE

## 2021-01-19 PROCEDURE — 99222 1ST HOSP IP/OBS MODERATE 55: CPT | Performed by: INTERNAL MEDICINE

## 2021-01-19 PROCEDURE — 2580000003 HC RX 258: Performed by: INTERNAL MEDICINE

## 2021-01-19 PROCEDURE — 94761 N-INVAS EAR/PLS OXIMETRY MLT: CPT

## 2021-01-19 PROCEDURE — 2500000003 HC RX 250 WO HCPCS: Performed by: INTERNAL MEDICINE

## 2021-01-19 PROCEDURE — 2700000000 HC OXYGEN THERAPY PER DAY

## 2021-01-19 PROCEDURE — 1200000000 HC SEMI PRIVATE

## 2021-01-19 PROCEDURE — 85384 FIBRINOGEN ACTIVITY: CPT

## 2021-01-19 PROCEDURE — 85025 COMPLETE CBC W/AUTO DIFF WBC: CPT

## 2021-01-19 PROCEDURE — 85379 FIBRIN DEGRADATION QUANT: CPT

## 2021-01-19 RX ORDER — HYDROCODONE POLISTIREX AND CHLORPHENIRAMINE POLISTIREX 10; 8 MG/5ML; MG/5ML
5 SUSPENSION, EXTENDED RELEASE ORAL EVERY 12 HOURS PRN
Status: DISCONTINUED | OUTPATIENT
Start: 2021-01-19 | End: 2021-01-20 | Stop reason: HOSPADM

## 2021-01-19 RX ADMIN — GLIPIZIDE 2.5 MG: 5 TABLET ORAL at 08:00

## 2021-01-19 RX ADMIN — Medication 6000 UNITS: at 11:42

## 2021-01-19 RX ADMIN — SODIUM CHLORIDE, PRESERVATIVE FREE 10 ML: 5 INJECTION INTRAVENOUS at 20:41

## 2021-01-19 RX ADMIN — GUAIFENESIN 600 MG: 600 TABLET, EXTENDED RELEASE ORAL at 11:43

## 2021-01-19 RX ADMIN — GUAIFENESIN AND CODEINE PHOSPHATE 5 ML: 100; 10 SOLUTION ORAL at 11:44

## 2021-01-19 RX ADMIN — METHYLPREDNISOLONE SODIUM SUCCINATE 40 MG: 40 INJECTION, POWDER, FOR SOLUTION INTRAMUSCULAR; INTRAVENOUS at 20:41

## 2021-01-19 RX ADMIN — LOSARTAN POTASSIUM 25 MG: 25 TABLET, FILM COATED ORAL at 11:51

## 2021-01-19 RX ADMIN — PANTOPRAZOLE SODIUM 40 MG: 40 TABLET, DELAYED RELEASE ORAL at 05:59

## 2021-01-19 RX ADMIN — BENZONATATE 100 MG: 100 CAPSULE ORAL at 15:14

## 2021-01-19 RX ADMIN — ENOXAPARIN SODIUM 30 MG: 30 INJECTION SUBCUTANEOUS at 20:40

## 2021-01-19 RX ADMIN — METFORMIN HYDROCHLORIDE 1000 MG: 500 TABLET ORAL at 17:26

## 2021-01-19 RX ADMIN — ENOXAPARIN SODIUM 30 MG: 30 INJECTION SUBCUTANEOUS at 11:44

## 2021-01-19 RX ADMIN — ATORVASTATIN CALCIUM 40 MG: 40 TABLET, FILM COATED ORAL at 11:46

## 2021-01-19 RX ADMIN — BENZONATATE 100 MG: 100 CAPSULE ORAL at 20:40

## 2021-01-19 RX ADMIN — REMDESIVIR 100 MG: 100 INJECTION, POWDER, LYOPHILIZED, FOR SOLUTION INTRAVENOUS at 15:00

## 2021-01-19 RX ADMIN — GUAIFENESIN 600 MG: 600 TABLET, EXTENDED RELEASE ORAL at 20:40

## 2021-01-19 RX ADMIN — METFORMIN HYDROCHLORIDE 1000 MG: 500 TABLET ORAL at 08:00

## 2021-01-19 RX ADMIN — DONEPEZIL HYDROCHLORIDE 10 MG: 10 TABLET, FILM COATED ORAL at 20:40

## 2021-01-19 RX ADMIN — BENZONATATE 100 MG: 100 CAPSULE ORAL at 09:00

## 2021-01-19 RX ADMIN — SODIUM CHLORIDE, PRESERVATIVE FREE 10 ML: 5 INJECTION INTRAVENOUS at 11:49

## 2021-01-19 RX ADMIN — GLIPIZIDE 2.5 MG: 5 TABLET ORAL at 15:14

## 2021-01-19 RX ADMIN — LEVOTHYROXINE SODIUM 50 MCG: 50 TABLET ORAL at 05:59

## 2021-01-19 RX ADMIN — METHYLPREDNISOLONE SODIUM SUCCINATE 40 MG: 40 INJECTION, POWDER, FOR SOLUTION INTRAMUSCULAR; INTRAVENOUS at 11:44

## 2021-01-19 ASSESSMENT — PAIN SCALES - GENERAL
PAINLEVEL_OUTOF10: 0

## 2021-01-19 NOTE — PROGRESS NOTES
Pt qualifies for home oxygen. Waiting for physician to copy and paste Progress Note and co-sign Home Oxygen Evaluation. Physician has been notified via [address] [place] [address].

## 2021-01-19 NOTE — PROGRESS NOTES
Instructed patient on how and why to use the IS and the acapella. Patient demonstrated ability to use both well.

## 2021-01-19 NOTE — CONSULTS
Subjective:   CHIEF COMPLAINT / HPI:  78year old male admitted with covid pneumonia. He has been treated for that and feels better. He also has underlying copd. He also has sascha and was on cpap till about six months ago when his machine broke and is in need for a new cpap machine.       Past Medical History:  Past Medical History:   Diagnosis Date    [facility]     [facility]     GERD (gastroesophageal reflux disease)     Hyperlipidemia     Hypertension        Past Surgical History:        Procedure Laterality Date    APPENDECTOMY      BACK SURGERY      HERNIA REPAIR      JOINT REPLACEMENT      STOMACH SURGERY         Current Medications:    Current Facility-Administered Medications: HYDROcodone-chlorpheniramine (TUSSIONEX) 10-8 MG/5ML oral suspension 5 mL, 5 mL, Oral, Q12H PRN  benzonatate (TESSALON) capsule 100 mg, 100 mg, Oral, TID  guaiFENesin-codeine (TUSSI-ORGANIDIN NR) 100-10 MG/5ML syrup 5 mL, 5 mL, Oral, Q4H PRN  insulin lispro (HUMALOG) injection vial 0-6 Units, 0-6 Units, Subcutaneous, TID WC  insulin lispro (HUMALOG) injection vial 0-3 Units, 0-3 Units, Subcutaneous, Nightly  0.9 % sodium chloride infusion, , Intravenous, PRN  [COMPLETED] remdesivir 200 mg in sodium chloride 0.9 % 250 mL IVPB, 200 mg, Intravenous, Once **FOLLOWED BY** remdesivir 100 mg in sodium chloride 0.9 % 250 mL IVPB, 100 mg, Intravenous, Q24H  0.9 % sodium chloride bolus, 30 mL, Intravenous, PRN  atorvastatin (LIPITOR) tablet 40 mg, 40 mg, Oral, Daily  donepezil (ARICEPT) tablet 10 mg, 10 mg, Oral, Nightly  glipiZIDE (GLUCOTROL) tablet 2.5 mg, 2.5 mg, Oral, BID AC  HYDROcodone-acetaminophen (NORCO)  MG per tablet 1 tablet, 1 tablet, Oral, Q4H PRN  levothyroxine (SYNTHROID) tablet 50 mcg, 50 mcg, Oral, Daily  losartan (COZAAR) tablet 25 mg, 25 mg, Oral, Daily  metFORMIN (GLUCOPHAGE) tablet 1,000 mg, 1,000 mg, Oral, BID WC  pantoprazole (PROTONIX) tablet 40 mg, 40 mg, Oral, QAM AC  0.9 % sodium chloride infusion, , Intravenous, PRN  sodium chloride flush 0.9 % injection 10 mL, 10 mL, Intravenous, 2 times per day  sodium chloride flush 0.9 % injection 10 mL, 10 mL, Intravenous, PRN  enoxaparin (LOVENOX) injection 30 mg, 30 mg, Subcutaneous, BID  promethazine (PHENERGAN) tablet 12.5 mg, 12.5 mg, Oral, Q6H PRN **OR** ondansetron (ZOFRAN) injection 4 mg, 4 mg, Intravenous, Q6H PRN  acetaminophen (TYLENOL) tablet 650 mg, 650 mg, Oral, Q6H PRN **OR** acetaminophen (TYLENOL) suppository 650 mg, 650 mg, Rectal, Q6H PRN  bisacodyl (DULCOLAX) EC tablet 5 mg, 5 mg, Oral, Daily PRN  methylPREDNISolone sodium (SOLU-MEDROL) injection 40 mg, 40 mg, Intravenous, Q12H  vitamin D CAPS 6,000 Units, 6,000 Units, Oral, Daily **FOLLOWED BY** [START ON 1/22/2021] vitamin D CAPS 2,000 Units, 2,000 Units, Oral, Daily  guaiFENesin (MUCINEX) extended release tablet 600 mg, 600 mg, Oral, BID    No Known Allergies    Social History:    Social History     Socioeconomic History    Marital status:      Spouse name: Not on file    Number of children: Not on file    Years of education: Not on file    Highest education level: Not on file   Occupational History    Not on file   Social Needs    Financial resource strain: Not on file    Food insecurity     Worry: Not on file     Inability: Not on file    Transportation needs     Medical: Not on file     Non-medical: Not on file   Tobacco Use    Smoking status: Former Smoker    Smokeless tobacco: Never Used   Substance and Sexual Activity    Alcohol use: Yes     Comment: occasionally    Drug use: Never    Sexual activity: Not on file   Lifestyle    Physical activity     Days per week: Not on file     Minutes per session: Not on file    Stress: Not on file   Relationships    Social connections     Talks on phone: Not on file     Gets together: Not on file     Attends Taoism service: Not on file     Active member of club or organization: Not on file     Attends meetings of clubs or organizations: Not on file     Relationship status: Not on file    Intimate partner violence     Fear of current or ex partner: Not on file     Emotionally abused: Not on file     Physically abused: Not on file     Forced sexual activity: Not on file   Other Topics Concern    Not on file   Social History Narrative    Not on file       Family History:   No family history on file. Immunization:    There is no immunization history on file for this patient. REVIEW OF SYSTEMS:    CONSTITUTIONAL:  negative for fevers, chills, diaphoresis, activity change, appetite change, fatigue, night sweats and unexpected weight change. EYES:  negative for blurred vision, eye discharge, visual disturbance and icterus  HEENT:  negative for hearing loss, tinnitus, ear drainage, sinus pressure, nasal congestion, epistaxis and snoring  RESPIRATORY:  See HPI  CARDIOVASCULAR:  negative for chest pain, palpitations, exertional chest pressure/discomfort, edema, syncope  GASTROINTESTINAL:  negative for nausea, vomiting, diarrhea, constipation, blood in stool and abdominal pain  GENITOURINARY:  negative for frequency, dysuria and hematuria  HEMATOLOGIC/LYMPHATIC:  negative for easy bruising, bleeding and lymphadenopathy  ALLERGIC/IMMUNOLOGIC:  negative for recurrent infections, angioedema, anaphylaxis and drug reactions  ENDOCRINE:  negative for weight changes and diabetic symptoms including polyuria, polydipsia and polyphagia    MUSCULOSKELETAL:  negative for  pain, joint swelling, decreased range of motion and muscle weakness  NEUROLOGICAL:  negative for headaches, slurred speech, unilateral weakness  PSYCHIATRIC/BEHAVIORAL: negative for hallucinations, behavioral problems, confusion and agitation.      Objective:   PHYSICAL EXAM:      VITALS:    Vitals:    01/18/21 2106 01/19/21 0029 01/19/21 0244 01/19/21 0354   BP: 110/63  100/66    Pulse: 62  53    Resp: 22 24 21 18   Temp: 98.3 °F (36.8 °C)  98.3 °F (36.8 °C)    TempSrc: Oral  Oral    SpO2: 99%  90%    Weight:   167 lb 6.4 oz (75.9 kg)    Height:             CONSTITUTIONAL:  awake, alert, cooperative, no apparent distress, and appears stated age  NECK:  Supple, symmetrical, trachea midline, no adenopathy, thyroid symmetric, not enlarged and no tenderness  CHEST: Chest expansion equal and symmetrical, no intercostal retraction. LUNGS:  no increased work of breathing, has expiratory wheezes both lungs, no crackles. CARDIOVASCULAR: S1 and S2, no edema and no JVD  ABDOMEN:  normal bowel sounds, non-distended and no masses palpated, and no tenderness to palpation. No hepatospleenomegaly  LYMPHADENOPATHY:  no axillary or supraclavicular adenopathy. No cervical adnenopathy  PSYCHIATRIC: Oriented to person place and time. No obvious depression or anxiety. MUSCULOSKELETAL: No obvious misalignment or effusion of the joints. No clubbing, cyanosis of the digits. RIGHT AND LEFT LOWER EXTREMITIES: No edema, no inflammation, no tenderness. SKIN:  normal skin color, texture, turgor and no redness, warmth, or swelling. No palpable nodules    DATA:                   EXAMINATION:   ONE XRAY VIEW OF THE CHEST       1/15/2021 11:21 am       COMPARISON:   None.       HISTORY:   ORDERING SYSTEM PROVIDED HISTORY: SOB   TECHNOLOGIST PROVIDED HISTORY:   Reason for exam:->SOB   Reason for Exam: SOB       FINDINGS:   There is evidence of left lung infiltrates and the suggestion also of a   subtle right upper lobe infiltrate.  These changes are compatible with   pneumonia.  Mild cardiomegaly.  COPD.  No active pleural disease.           Impression   Bilateral perihilar infiltrates worse on the left.  Pneumonia suspected             Assessment:     1. covid pneumonia  2. Copd  3. sascha          Plan:     1. D/w pt  2. cpm  3. Home o2 eval  4. Full pft as outpt  5. He probably will need a new sleep study in order to get a new cpap machine  6.  As [facility] and if [facility] then would like to fu with me as outpt  7.  Thanks will follow

## 2021-01-19 NOTE — PROGRESS NOTES
1/19/2021 12:21 PM  Patient Room #: 0331/3028-A  Patient Name: Simran Sloan    (Step 1 Done by RN if possible otherwise call Pulmonary Diagnostics)  1. Place patient on room air at rest for at least 30 minutes. If patient falls below 88% before 30 minutes then you can record the level and stop. Record room air saturation level __ %. If patient is at 88% or below, they will qualify for home oxygen and you can stop. If level does not fall below 88%, fill in level above. If indicated continue to Step 2. Signature:_____ Date: ___  (Step 2&3 Done by [facility])  2. Ambulate patient on room air until saturation falls below 89%. Record level of room air saturation with ambulation___ %. Next, place patient back on ___lpm oxygen and ambulate, record level __%. (Note:  this level must show improvement from room air level done with ambulation.)  If patients saturation on room air with ambulation is 88% or below AND patient shows improvement with oxygen during ambulation, they will qualify for home oxygen and you can stop. If patient does not drop below 89%, then patient should have an overnight oximetry trending on room air to see if level falls below 88%. Complete level in Step 3 below. 3. Room air overnight oximetry level 88 % for___  cumulative minutes. If patients room air oxygen level is < 89% for at least 5 cumulative minutes, patient will qualify for home oxygen and you can stop. (Attach Night Trending Report)    Complete order below: Diagnosis:_Covid 19, COPD___  Home oxygen at:  Length of Need: ?X Lifetime ?  3 Months     ___lpm or __%   via  [] nasal cannula  []mask  [] other:___         []continuous []  with activity  []  Nocturnal   [] Portable Tanks []  Concentrator        Therapist Signature:_______     Date:  ___  Physician Signature:  __Electronically Signed in EMR_    Date:___  Physician Printed Name:  Lamonte Solis MD NPI:  [id number] [] Patient Qualifies      [] Patient Does NOT qualify

## 2021-01-19 NOTE — PROGRESS NOTES
01/19/21 0354   NIV Type   NIV Started/Stopped On   Equipment Type resmed   Mode Auto-PAP   Mask Type Nasal mask   Settings/Measurements   Resp 18   O2 Flow Rate (L/min) 3 L/min   SpO2 93

## 2021-01-19 NOTE — CONSULTS
Per the physical rehabilitation triage process, the PT referral will be held at this time. Recommend mobility-ambulation per nursing staff. No skilled therapy needs per chart review. Limited mobility so far, has been indep in room per flowsheets.   Pinky Gaytan, PT,   1/19/2021, 9:37 AM

## 2021-01-19 NOTE — CONSULTS
Endocrinology   Consult Note      Dear Doctor Raul Hwang    Thank You for the Consult     Pt. Was Admitted for : Worsening of shortness of breath patient is positive for Covid    Reason for Consult: Better control of blood glucose    History Obtained From:  Patient/ EMR       HISTORY OF PRESENT ILLNESS:                The patient is a 78 y.o. male with significant past medical history of COPD, GERD, pretension, hyperlipidemia and diabetes mellitus, hypothyroidism, complaining of shortness of breath feeling worse and found to be positive for Covid. Chest x-ray done showed that patient had the lungs consistent with bilateral pneumonia left more than the left. I was  consulted for better control of blood glucose. ROS:   Pt's ROS done in detail. Abnormal ROS are noted in Medical and Surgical History Section below: Other Medical History:        Diagnosis Date    [facility]     Diabetes mellitus [facility]     GERD (gastroesophageal reflux disease)     Hyperlipidemia     Hypertension      Surgical History:        Procedure Laterality Date    APPENDECTOMY      BACK SURGERY      HERNIA REPAIR      JOINT REPLACEMENT      STOMACH SURGERY         Allergies:  Patient has no known allergies. Family History:   No family history on file. REVIEW OF SYSTEMS:  Review of System Done as noted above     PHYSICAL EXAM:      Vitals:    /63   Pulse 62   Temp 98.3 °F (36.8 °C) (Oral)   Resp 22   Ht 5' 6\" (1.676 m)   Wt 180 lb (81.6 kg)   SpO2 99%   BMI 29.05 kg/m²     CONSTITUTIONAL:  awake, alert, cooperative, appears stated age   EYES:  vision intact Fundoscopic Exam not performed   ENT:Normal  NECK:  Supple, No JVD.    Thyroid Exam:Normal   LUNGS:  Has Vesicular Breath Sounds, has some diminished breath sounds and wheezing bilaterally  CARDIOVASCULAR:  Normal apical impulse, regular rate and rhythm, normal S1 and S2, no S3 or S4, and has no  murmur   ABDOMEN:  No scars, normal bowel sounds, soft, non-distended, non-tender, no masses palpated, no hepatolienomegaly  Musculoskeletal: Normal  Extremities: Normal, peripheral pulses normal, , has no edema   NEUROLOGIC:  Awake, alert, oriented to name, place and time. Cranial nerves II-XII are grossly intact. Motor is  intact. Sensory is intact. ,  and gait is normal.    DATA:    CBC:   Recent Labs     01/17/21  1011 01/18/21  0000   WBC 4.7 5.1   HGB 12.1* 11.8*    155    CMP:  Recent Labs     01/16/21  0213 01/17/21  1011 01/18/21  0000    132* 134*   K 4.6 4.3 4.6    100 102   CO2 22 20* 19*   BUN 14 24* 24*   CREATININE 0.7* 0.8* 0.7*   CALCIUM 8.1* 8.3 8.1*   PROT 6.2* 5.8* 5.9*   LABALBU 3.6 3.5 3.3*   BILITOT 0.2 0.2 0.2   ALKPHOS 55 49 48   AST 25 20 20   ALT 29 22 22     Lipids: No results found for: CHOL, HDL, TRIG  Glucose:   Recent Labs     01/17/21  1757 01/18/21  1259 01/18/21  1701   POCGLU 139* 92 106*     Hemoglobin A1C:   Lab Results   Component Value Date    LABA1C 7.0 01/17/2021     Free T4: No results found for: T4FREE  Free T3: No results found for: FT3  TSH High Sensitivity: No results found for: [facility]    Xr Chest Portable    Result Date: 1/15/2021  EXAMINATION: ONE XRAY VIEW OF THE CHEST 1/15/2021 11:21 am COMPARISON: None. HISTORY: ORDERING SYSTEM PROVIDED HISTORY: SOB TECHNOLOGIST PROVIDED HISTORY: Reason for exam:->SOB Reason for Exam: SOB FINDINGS: There is evidence of left lung infiltrates and the suggestion also of a subtle right upper lobe infiltrate. These changes are compatible with pneumonia. Mild cardiomegaly. COPD. No active pleural disease. Bilateral perihilar infiltrates worse on the left. Pneumonia suspected.        Scheduled Medicines   Medications:    benzonatate  100 mg Oral TID    remdesivir IVPB  100 mg Intravenous Q24H    atorvastatin  40 mg Oral Daily    donepezil  10 mg Oral Nightly    glipiZIDE  2.5 mg Oral BID AC    levothyroxine  50 mcg Oral Daily  losartan  25 mg Oral Daily    metFORMIN  1,000 mg Oral BID WC    pantoprazole  40 mg Oral QAM AC    sodium chloride flush  10 mL Intravenous 2 times per day    enoxaparin  30 mg Subcutaneous BID    methylPREDNISolone  40 mg Intravenous Q12H    vitamin D  6,000 Units Oral Daily    Followed by   Jef Cedillo ON 1/22/2021] vitamin D  2,000 Units Oral Daily    guaiFENesin  600 mg Oral BID      Infusions:    sodium chloride      sodium chloride           IMPRESSION    Patient Active Problem List   Diagnosis    Pneumonia due to COVID-19 virus        COPD       Diabetes mellitus       Hyperlipidemia      Hypothyroidism      RECOMMENDATIONS:      1. Reviewed POC blood glucose . Labs and X ray results   2. Reviewed Home and Current Medicines   3. Will Start On Correction bolus HumalogInsulin regime / OHGD   4. Monitor Blood glucose frequently   5. Modify  the dose of Insulin/ OHGD  as needed        Will follow with you  Again thank you for sharing pt's care with me.      Truly yours,       Steven Diamond MD

## 2021-01-19 NOTE — PROGRESS NOTES
CHART REVIEWED  FULL NOTE TO FOLLOW                      Name:  Clara Judd /Age/Sex: 1950  (78 y.o. male)   MRN & CSN:  [id number] & [id number] Admission Date/Time: 1/15/2021 11:08 AM   Location:  3139/8204-Q PCP: No primary care provider on file. Hospital Day: 5          Referring physician:  Jennifer Moncada DO         Reason for consultation:  saúl        Thanks for referral.    Information source: patient    CC;  sob      HPI:   Thank you for involving me in taking  care of Clara Judd who  is a 78 y. o.year  Old male  Presents with  H/o COPD, HTN, hyperlipidimea, DM  Admitted with sob, now on tele has SB, has no CP. Past medical history:    has a past medical history of COPD (chronic obstructive pulmonary disease) ([facility] [address] Diabetes mellitus ([facility] [address] GERD (gastroesophageal reflux disease), Hyperlipidemia, and Hypertension. Past surgical history:   has a past surgical history that includes Appendectomy; hernia repair; joint replacement; back surgery; and Stomach surgery. Social History:   reports that he has quit smoking. He has never used smokeless tobacco. He reports current alcohol use. He reports that he does not use drugs. Family history:  family history is not on file.     No Known Allergies        HYDROcodone-chlorpheniramine (TUSSIONEX) 10-8 MG/5ML oral suspension 5 mL, Q12H PRN      benzonatate (TESSALON) capsule 100 mg, TID      guaiFENesin-codeine (TUSSI-ORGANIDIN NR) 100-10 MG/5ML syrup 5 mL, Q4H PRN      insulin lispro (HUMALOG) injection vial 0-6 Units, TID WC      insulin lispro (HUMALOG) injection vial 0-3 Units, Nightly      0.9 % sodium chloride infusion, PRN      remdesivir 100 mg in sodium chloride 0.9 % 250 mL IVPB, Q24H      0.9 % sodium chloride bolus, PRN      atorvastatin (LIPITOR) tablet 40 mg, Daily      donepezil (ARICEPT) tablet 10 mg, Nightly      glipiZIDE (GLUCOTROL) tablet 2.5 mg, BID AC      HYDROcodone-acetaminophen (NORCO)  MG per tablet 1 tablet, Q4H PRN      levothyroxine (SYNTHROID) tablet 50 mcg, Daily      losartan (COZAAR) tablet 25 mg, Daily      metFORMIN (GLUCOPHAGE) tablet 1,000 mg, BID WC      pantoprazole (PROTONIX) tablet 40 mg, QAM AC      0.9 % sodium chloride infusion, PRN      sodium chloride flush 0.9 % injection 10 mL, 2 times per day      sodium chloride flush 0.9 % injection 10 mL, PRN      enoxaparin (LOVENOX) injection 30 mg, BID      promethazine (PHENERGAN) tablet 12.5 mg, Q6H PRN    Or      ondansetron (ZOFRAN) injection 4 mg, Q6H PRN      acetaminophen (TYLENOL) tablet 650 mg, Q6H PRN    Or      acetaminophen (TYLENOL) suppository 650 mg, Q6H PRN      bisacodyl (DULCOLAX) EC tablet 5 mg, Daily PRN      methylPREDNISolone sodium (SOLU-MEDROL) injection 40 mg, Q12H      vitamin D CAPS 6,000 Units, Daily    Followed by    Nathan Gleason ON 1/22/2021] vitamin D CAPS 2,000 Units, Daily      guaiFENesin (MUCINEX) extended release tablet 600 mg, BID      Current Facility-Administered Medications   Medication Dose Route Frequency Provider Last Rate Last Admin    HYDROcodone-chlorpheniramine (TUSSIONEX) 10-8 MG/5ML oral suspension 5 mL  5 mL Oral Q12H PRN Rebecca Ngo MD        benzonatate (TESSALON) capsule 100 mg  100 mg Oral TID Cem Meadows MD   100 mg at 01/18/21 2102    guaiFENesin-codeine (TUSSI-ORGANIDIN NR) 100-10 MG/5ML syrup 5 mL  5 mL Oral Q4H PRN Cem Meadows MD   5 mL at 01/18/21 1750    insulin lispro (HUMALOG) injection vial 0-6 Units  0-6 Units Subcutaneous TID  Dania Davalos MD        insulin lispro (HUMALOG) injection vial 0-3 Units  0-3 Units Subcutaneous Nightly Dania Davalos MD   1 Units at 01/18/21 2337    0.9 % sodium chloride infusion   Intravenous PRN Julia Montiel MD        remdesivir 100 mg in sodium chloride 0.9 % 250 mL IVPB  100 mg Intravenous Q24H Julia Montiel MD   Stopped at 01/18/21 1830    0.9 % sodium chloride bolus 30 mL Intravenous PRN Natalie Barrios MD        atorvastatin (LIPITOR) tablet 40 mg  40 mg Oral Daily [employer], DO   40 mg at 01/18/21 0428    donepezil (ARICEPT) tablet 10 mg  10 mg Oral Nightly Yi Singh, DO   10 mg at 01/18/21 2102    glipiZIDE (GLUCOTROL) tablet 2.5 mg  2.5 mg Oral BID AC Lise Severs, MD   2.5 mg at 01/18/21 1754    HYDROcodone-acetaminophen (NORCO)  MG per tablet 1 tablet  1 tablet Oral Q4H PRN [employer], DO        levothyroxine (SYNTHROID) tablet 50 mcg  50 mcg Oral Daily Yi Singh, DO   50 mcg at 01/19/21 0559    losartan (COZAAR) tablet 25 mg  25 mg Oral Daily Yi Singh, DO   25 mg at 01/18/21 0934    metFORMIN (GLUCOPHAGE) tablet 1,000 mg  1,000 mg Oral BID WC Lise Severs, MD   1,000 mg at 01/18/21 1750    pantoprazole (PROTONIX) tablet 40 mg  40 mg Oral QAM AC Yi Singh, DO   40 mg at 01/19/21 0559    0.9 % sodium chloride infusion   Intravenous PRN [employer], DO        sodium chloride flush 0.9 % injection 10 mL  10 mL Intravenous 2 times per day [employer], DO   10 mL at 01/18/21 2101    sodium chloride flush 0.9 % injection 10 mL  10 mL Intravenous PRN [employer], DO        enoxaparin (LOVENOX) injection 30 mg  30 mg Subcutaneous BID Yi Singh, DO   30 mg at 01/18/21 2101    promethazine (PHENERGAN) tablet 12.5 mg  12.5 mg Oral Q6H PRN Yi Singh, DO        Or    ondansetron (ZOFRAN) injection 4 mg  4 mg Intravenous Q6H PRN [employer], DO        acetaminophen (TYLENOL) tablet 650 mg  650 mg Oral Q6H PRN [employer], DO   650 mg at 01/17/21 1233    Or    acetaminophen (TYLENOL) suppository 650 mg  650 mg Rectal Q6H PRN [employer], DO        bisacodyl (DULCOLAX) EC tablet 5 mg  5 mg Oral Daily PRN [employer], DO        methylPREDNISolone sodium (SOLU-MEDROL) injection 40 mg  40 mg Intravenous Q12H Yi Singh DO   40 mg at 01/18/21 2101    vitamin D CAPS 6,000 Units  6,000 Units Oral Daily Barbsourav Benton, DO   6,000 Units at 01/18/21 0933    Followed by   Davian Talavera ON 1/22/2021] vitamin D CAPS 2,000 Units  2,000 Units Oral Daily Yi Timpson Band, DO        guaiFENesin [facility]) extended release tablet 600 mg  600 mg Oral BID Kyler Benton DO   600 mg at 01/18/21 2102     Review of Systems:  All 14 systems reviewed, all negative except for  sob    Physical Examination:    /66   Pulse 53   Temp 98.3 °F (36.8 °C) (Oral)   Resp 18   Ht 5' 6\" (1.676 m)   Wt 167 lb 6.4 oz (75.9 kg)   SpO2 90%   BMI 27.02 kg/m²      Wt Readings from Last 3 Encounters:   01/19/21 167 lb 6.4 oz (75.9 kg)     Body mass index is 27.02 kg/m². General Appearance:  fair  Head: normocephalic     Eyes: normal, noninjected conjunctiva    ENT: normal mucosa, noninjected throat, normal     NECK: No JVP  No thyromegaly        Cardiovascular: No thrills palpated   Auscultation: Normal S1 and S2,  no murmur   carotid bruit no   Abdominal Aorta no bruit    Respiratory:    Breath sounds erin crackle    Extremities:  none Edema clubbing ,   no cyanosis    SKIN: Warm and well perfused, no pallor or cyanosis    Vascular exam:  Pedal Pulses: palp  bilaterally        Abdomen:  No masses or tenderness. No organomegaly noted. Neurological:  Oriented to time, place, and person   No focal neurological deficit noted. Psychiatric:normal mood, no anxiety    Lab Review   Recent Labs     01/19/21  0450   WBC 3.9*   HGB 11.7*   HCT 36.9*         Recent Labs     01/19/21  0450      K 4.4      CO2 22   BUN 24*   CREATININE 0.8*     Recent Labs     01/19/21  0450   AST 20   ALT 21   BILITOT 0.3   ALKPHOS 46     No results for input(s): TROPONINI in the last 72 hours. No results found for: BNP  No results found for: INR, PROTIME        Assessment/Recommendations:     - Bradycardia: ? Etiology ?  From COVID  - SOB sec to COPD and COVID  - check echo         Abhishek Hernandez MD, 1/19/2021 10:20 AM

## 2021-01-19 NOTE — PROGRESS NOTES
[address] [place] [address]  HOSPITALIST PROGRESS NOTE                       Name:  Anitha Deutsch /Age/Sex: 1950  (78 y.o. male)   MRN & CSN:  [id number] & [id number] Admission Date/Time: 1/15/2021 11:08 AM   Location:  [facility]4226-P Attending:  MD JAMARCUS Garcia  Anitha Deutsch is a 78 y.o. male who presents with acute respiratory failure on 1/15    SUBJECTIVE  With coughing spell, on NC oxygen when seen. No chest pain/shortness of breath at rest. Still overall does not feel good    10 point review of systems reviewed and negative unless noted above. ALLERGIES: No Known Allergies    PCP: No primary care provider on file. PAST MEDICAL HISTORY, SURGICAL HISTORY, SOCIAL HISTORY and  HOME MEDICATIONS all reviewed. OBJECTIVE  Vitals:    21 2106 21 0029 21 0244 21 0354   BP: 110/63  100/66    Pulse: 62  53    Resp:    Temp: 98.3 °F (36.8 °C)  98.3 °F (36.8 °C)    TempSrc: Oral  Oral    SpO2: 99%  90%    Weight:   167 lb 6.4 oz (75.9 kg)    Height:           PHYSICAL EXAM   GEN Awake male, sitting upright in bed in no apparent distress. EYES Pupils are equally round. No scleral erythema, discharge, or conjunctivitis. HENT Mucous membranes are moist. Oral pharynx without exudates, no evidence of thrush. NECK Supple, no apparent thyromegaly or masses. RESP Unlabored respiration, bilateral coarse breath sounds  CARDIO/VASC S1/S2 auscultated. Regular rate without appreciable murmurs, rubs, or gallops. No JVD or carotid bruits. Peripheral pulses equal bilaterally and palpable. No peripheral edema. GI Abdomen is soft without significant tenderness, masses, or guarding. Bowel sounds are normoactive.  No costovertebral angle tenderness. Normal appearing external genitalia. HEME/LYMPH No palpable cervical lymphadenopathy and no hepatosplenomegaly. No petechiae or ecchymoses. MSK Spontaneous movement of all extremities. HYDROcodone-acetaminophen, sodium chloride, sodium chloride flush, promethazine **OR** ondansetron, acetaminophen **OR** acetaminophen, bisacodyl        ASSESSMENT and PLAN  Hospital Day: 5       1- Acute respiratory failure with hypoxia due to COVID-19  -tested covid positive on 1/15- on dexamethasone and remdeserivr.  - on empiric abx  - discussed will do home oxygen evaluation today- if he feels better- may discharge in am     2-Bradycardia- suspect due to LAUREL- check TSH, monitor on telemetry       Other chronic medical conditions, medication resumed unless contraindicated     -COPD not in exacerbation  -DM Type 2 -continue insulin  -GERD  -HLD  -Essential HTN -continue to monitor blood pressure        Abdominal pain  -Noted 1/17-and has improved on 1/18      Potential discharge in am if he ambulates and stays stable on current oxygen, will do home oxygen evaluation today    Patient was seen in hospital for Covid 19, COPD. I am prescribing oxygen because the diagnosis and testing requires the patient to have oxygen in the home. Conditions will improve or be benefited by oxygen use.   The patient is able to perform good mobility and therefore requires the use of a portable oxygen system for ambulation.             Disp:     Diet DIET GENERAL;   DVT Prophylaxis [] Lovenox, []  Heparin, [] SCDs, [] Ambulation   GI Prophylaxis [] PPI,  [] H2 Blocker,  [] Carafate,  [] Diet/Tube Feeds   Code Status Full Code   Disposition Patient requires continued admission due to acute respiratory failure   CMS Level of Risk [] Low, [] Moderate,[x]  High  Patient's risk as above due to acute respiratory failure     KURT SPICER MD 1/19/2021 9:03 AM

## 2021-01-19 NOTE — PROGRESS NOTES
Home Oxygen Evaluation is complete and has been faxed to [facility]. Please call [facility] () when the pt is being discharged for the delivery of an oxygen. .  Do NOT let the pt leave without supplemental oxygen.

## 2021-01-20 VITALS
RESPIRATION RATE: 23 BRPM | BODY MASS INDEX: 26.9 KG/M2 | HEART RATE: 59 BPM | WEIGHT: 167.4 LBS | HEIGHT: 66 IN | DIASTOLIC BLOOD PRESSURE: 82 MMHG | TEMPERATURE: 98.1 F | OXYGEN SATURATION: 93 % | SYSTOLIC BLOOD PRESSURE: 104 MMHG

## 2021-01-20 LAB
ALBUMIN SERPL-MCNC: 3.3 GM/DL (ref 3.4–5)
ALP BLD-CCNC: 47 IU/L (ref 40–128)
ALT SERPL-CCNC: 22 U/L (ref 10–40)
ANION GAP SERPL CALCULATED.3IONS-SCNC: 14 MMOL/L (ref 4–16)
AST SERPL-CCNC: 20 IU/L (ref 15–37)
BASOPHILS ABSOLUTE: 0 K/CU MM
BASOPHILS RELATIVE PERCENT: 0 % (ref 0–1)
BILIRUB SERPL-MCNC: 0.3 MG/DL (ref 0–1)
BUN BLDV-MCNC: 26 MG/DL (ref 6–23)
CALCIUM SERPL-MCNC: 8 MG/DL (ref 8.3–10.6)
CHLORIDE BLD-SCNC: 101 MMOL/L (ref 99–110)
CO2: 20 MMOL/L (ref 21–32)
CREAT SERPL-MCNC: 0.7 MG/DL (ref 0.9–1.3)
D DIMER: 205 NG/ML(DDU)
DIFFERENTIAL TYPE: ABNORMAL
EOSINOPHILS ABSOLUTE: 0 K/CU MM
EOSINOPHILS RELATIVE PERCENT: 0 % (ref 0–3)
FIBRINOGEN LEVEL: 397 MG/DL (ref 196.9–442.1)
GFR AFRICAN AMERICAN: >60 ML/MIN/1.73M2
GFR NON-AFRICAN AMERICAN: >60 ML/MIN/1.73M2
GLUCOSE BLD-MCNC: 140 MG/DL (ref 70–99)
GLUCOSE BLD-MCNC: 212 MG/DL (ref 70–99)
HCT VFR BLD CALC: 37.3 % (ref 42–52)
HEMOGLOBIN: 12 GM/DL (ref 13.5–18)
IMMATURE NEUTROPHIL %: 0.2 % (ref 0–0.43)
LYMPHOCYTES ABSOLUTE: 0.4 K/CU MM
LYMPHOCYTES RELATIVE PERCENT: 10.9 % (ref 24–44)
MCH RBC QN AUTO: 30.3 PG (ref 27–31)
MCHC RBC AUTO-ENTMCNC: 32.2 % (ref 32–36)
MCV RBC AUTO: 94.2 FL (ref 78–100)
MONOCYTES ABSOLUTE: 0.5 K/CU MM
MONOCYTES RELATIVE PERCENT: 11.4 % (ref 0–4)
NUCLEATED RBC %: 0 %
PDW BLD-RTO: 13.9 % (ref 11.7–14.9)
PLATELET # BLD: 158 K/CU MM (ref 140–440)
PMV BLD AUTO: 10.9 FL (ref 7.5–11.1)
POTASSIUM SERPL-SCNC: 4.6 MMOL/L (ref 3.5–5.1)
RBC # BLD: 3.96 M/CU MM (ref 4.6–6.2)
SEGMENTED NEUTROPHILS ABSOLUTE COUNT: 3.1 K/CU MM
SEGMENTED NEUTROPHILS RELATIVE PERCENT: 77.5 % (ref 36–66)
SODIUM BLD-SCNC: 135 MMOL/L (ref 135–145)
TOTAL IMMATURE NEUTOROPHIL: 0.01 K/CU MM
TOTAL NUCLEATED RBC: 0 K/CU MM
TOTAL PROTEIN: 5.6 GM/DL (ref 6.4–8.2)
WBC # BLD: 4 K/CU MM (ref 4–10.5)

## 2021-01-20 PROCEDURE — 85025 COMPLETE CBC W/AUTO DIFF WBC: CPT

## 2021-01-20 PROCEDURE — 80053 COMPREHEN METABOLIC PANEL: CPT

## 2021-01-20 PROCEDURE — 85384 FIBRINOGEN ACTIVITY: CPT

## 2021-01-20 PROCEDURE — 6370000000 HC RX 637 (ALT 250 FOR IP): Performed by: STUDENT IN AN ORGANIZED HEALTH CARE EDUCATION/TRAINING PROGRAM

## 2021-01-20 PROCEDURE — 2580000003 HC RX 258: Performed by: STUDENT IN AN ORGANIZED HEALTH CARE EDUCATION/TRAINING PROGRAM

## 2021-01-20 PROCEDURE — 6370000000 HC RX 637 (ALT 250 FOR IP): Performed by: INTERNAL MEDICINE

## 2021-01-20 PROCEDURE — 2700000000 HC OXYGEN THERAPY PER DAY

## 2021-01-20 PROCEDURE — 82962 GLUCOSE BLOOD TEST: CPT

## 2021-01-20 PROCEDURE — 85379 FIBRIN DEGRADATION QUANT: CPT

## 2021-01-20 PROCEDURE — 6360000002 HC RX W HCPCS: Performed by: STUDENT IN AN ORGANIZED HEALTH CARE EDUCATION/TRAINING PROGRAM

## 2021-01-20 PROCEDURE — 94761 N-INVAS EAR/PLS OXIMETRY MLT: CPT

## 2021-01-20 RX ORDER — BENZONATATE 100 MG/1
100 CAPSULE ORAL 3 TIMES DAILY
Qty: 21 CAPSULE | Refills: 0 | Status: SHIPPED | OUTPATIENT
Start: 2021-01-20 | End: 2021-01-27

## 2021-01-20 RX ORDER — DEXAMETHASONE 4 MG/1
6 TABLET ORAL DAILY
Status: DISCONTINUED | OUTPATIENT
Start: 2021-01-20 | End: 2021-01-20 | Stop reason: HOSPADM

## 2021-01-20 RX ORDER — DEXAMETHASONE 6 MG/1
6 TABLET ORAL DAILY
Qty: 5 TABLET | Refills: 0 | Status: SHIPPED | OUTPATIENT
Start: 2021-01-20 | End: 2021-01-25

## 2021-01-20 RX ADMIN — GLIPIZIDE 2.5 MG: 5 TABLET ORAL at 09:24

## 2021-01-20 RX ADMIN — ATORVASTATIN CALCIUM 40 MG: 40 TABLET, FILM COATED ORAL at 09:16

## 2021-01-20 RX ADMIN — METFORMIN HYDROCHLORIDE 1000 MG: 500 TABLET ORAL at 09:16

## 2021-01-20 RX ADMIN — LEVOTHYROXINE SODIUM 50 MCG: 50 TABLET ORAL at 05:27

## 2021-01-20 RX ADMIN — PANTOPRAZOLE SODIUM 40 MG: 40 TABLET, DELAYED RELEASE ORAL at 05:27

## 2021-01-20 RX ADMIN — BENZONATATE 100 MG: 100 CAPSULE ORAL at 09:17

## 2021-01-20 RX ADMIN — ENOXAPARIN SODIUM 30 MG: 30 INJECTION SUBCUTANEOUS at 09:17

## 2021-01-20 RX ADMIN — Medication 6000 UNITS: at 09:16

## 2021-01-20 RX ADMIN — SODIUM CHLORIDE, PRESERVATIVE FREE 10 ML: 5 INJECTION INTRAVENOUS at 09:17

## 2021-01-20 RX ADMIN — LOSARTAN POTASSIUM 25 MG: 25 TABLET, FILM COATED ORAL at 09:16

## 2021-01-20 RX ADMIN — GUAIFENESIN 600 MG: 600 TABLET, EXTENDED RELEASE ORAL at 09:16

## 2021-01-20 ASSESSMENT — PAIN SCALES - GENERAL: PAINLEVEL_OUTOF10: 0

## 2021-01-20 NOTE — PROGRESS NOTES
01/19/21 1542   NIV Type   NIV Started/Stopped On   Equipment Type resmed   Mode Auto-PAP   Mask Type Nasal mask   Mask Size Medium   Settings/Measurements   Resp 25   O2 Flow Rate (L/min) 3 L/min

## 2021-01-20 NOTE — DISCHARGE SUMMARY
Discharge Summary    Name:  Bonifacio Burton /Age/Sex: 1950  (78 y.o. male)   MRN & CSN:  [id number] & [id number] Admission Date/Time: 1/15/2021 11:08 AM   Attending:  Radha Thacker MD Discharging Physician: Iglesia King MD     HPI and Hospital Course:   Bonifacio Burton is a 78 y.o.  male  who presented with acute respiratory failure    HPI- as per H and [address]     1- Acute respiratory failure with hypoxia due to COVID-19  -tested covid positive on 1/15- on dexamethasone and remdeserivr.  - on empiric abx  -subjectively feels improved, on [place] oxygen and qualified for home oxygen- wants to go home. Will discharge on dexamethasone to complete 10 days total. Does not want eliquis for DVT prophylaxis      2-Bradycardia- suspect due to LAUREL- brief episode- needs a new CPAP machine and [facility].        Other chronic medical conditions, medication resumed unless contraindicated     -COPD not in exacerbation  -DM Type 2 -continue insulin  -GERD  -HLD  -Essential HTN -continue to monitor blood pressure         The patient expressed appropriate understanding of and agreement with the discharge recommendations, medications, and plan.      Consults this admission:  IP CONSULT TO HOSPITALIST  IP CONSULT TO PHARMACY  IP CONSULT TO PHARMACY  IP CONSULT TO ENDOCRINOLOGY  IP CONSULT TO PULMONOLOGY  IP CONSULT TO CARDIOLOGY    Discharge Instruction:   Follow up appointments:   Primary care physician:  within 2 weeks    Diet:  General/cardiac/ADA/as tolerated  Activity: {discharge activity: as tolerated  Disposition: Discharged to:   [x]Home, []HHC, []SNF, []Acute Rehab, []Hospice   Condition on discharge: Stable    Discharge Medications:      Gaines Cheadle   Home Medication Instructions MLL:[id number]    Printed on:21 1142   Medication Information                      Albuterol Sulfate (PROAIR HFA IN)  Inhale 2 puffs into the lungs 2 times daily atorvastatin (LIPITOR) 80 MG tablet  Take 40 mg by mouth daily             benzonatate (TESSALON) 100 MG capsule  Take 1 capsule by mouth 3 times daily for 7 days             dexamethasone (DECADRON) 6 MG tablet  Take 1 tablet by mouth daily for 5 days             donepezil (ARICEPT) 10 MG tablet  Take 10 mg by mouth nightly             glipiZIDE (GLUCOTROL) 5 MG tablet  Take 2.5 mg by mouth 2 times daily (before meals)             HYDROcodone-acetaminophen (NORCO)  MG per tablet  Take 1 tablet by mouth every 4 hours as needed for Pain.             levothyroxine (SYNTHROID) 50 MCG tablet  Take 50 mcg by mouth Daily             losartan (COZAAR) 25 MG tablet  Take 25 mg by mouth daily             meclizine (ANTIVERT) 25 MG CHEW  Take 25 mg by mouth 2 times daily as needed             metFORMIN (GLUCOPHAGE) 1000 MG tablet  Take 1,000 mg by mouth 2 times daily (with meals)             omeprazole (PRILOSEC) 20 MG delayed release capsule  Take 20 mg by mouth 2 times daily             vitamin D (CHOLECALCIFEROL) 25 MCG (1000 UT) TABS tablet  Take 1,000 Units by mouth daily             vitamin D 25 MCG (1000 UT) CAPS  Take 2 capsules by mouth daily                 Objective Findings at Discharge:   /82   Pulse 59   Temp 98.1 °F (36.7 °C) (Oral)   Resp 23   Ht 5' 6\" (1.676 m)   Wt 167 lb 6.4 oz (75.9 kg)   SpO2 93%   BMI 27.02 kg/m²            PHYSICAL EXAM   GEN Awake male, laying in bed in no apparent distress. EYES Pupils are equally round. No scleral discharge  HENT Atraumatic and symmetric head  NECK No apparent thyromegaly  RESP Symmetric chest movement   CARDIO/VASC Peripheral pulses equal bilaterally and palpable. No peripheral edema. GI Abdomen is not distended. Rectal exam deferred.  Alarcon catheter is not present. HEME/LYMPH No petechiae or ecchymoses. MSK Spontaneous movement of BL upper extremities  SKIN Normal coloration, warm, dry.   NEURO Cranial nerves appear grossly intact  PSYCH Awake, alert.     BMP/CBC  Recent Labs     01/18/21  0000 01/19/21  0450 01/20/21  0412   * 135 135   K 4.6 4.4 4.6    100 101   CO2 19* 22 20*   BUN 24* 24* 26*   CREATININE 0.7* 0.8* 0.7*   WBC 5.1 3.9* 4.0   HCT 37.0* 36.9* 37.3*    152 158     SIGNIFICANT IMAGING AND LABS:      Discharge Time of 32 minutes    Electronically signed by Sherry Null MD on 1/20/2021 at 11:42 AM

## 2021-01-20 NOTE — PROGRESS NOTES
Progress Note( Dr. Singh Palumbo)  1/19/2021  Subjective:   Admit Date: 1/15/2021  PCP: No primary care provider on file. Admitted For :Worsening of shortness of breath patient is positive for Covid    Consulted For: Better control of blood glucose    Interval History: Feels somewhat better    Denies any chest pains,   Yes SOB . On room air on minimum oxygen  Denies nausea or vomiting. No new bowel or bladder symptoms.        Intake/Output Summary (Last 24 hours) at 1/19/2021 2344  Last data filed at 1/19/2021 2234  Gross per 24 hour   Intake 250 ml   Output 700 ml   Net -450 ml       DATA    CBC:   Recent Labs     01/17/21  1011 01/18/21  0000 01/19/21  0450   WBC 4.7 5.1 3.9*   HGB 12.1* 11.8* 11.7*    155 152    CMP:  Recent Labs     01/17/21  1011 01/18/21  0000 01/19/21  0450   * 134* 135   K 4.3 4.6 4.4    102 100   CO2 20* 19* 22   BUN 24* 24* 24*   CREATININE 0.8* 0.7* 0.8*   CALCIUM 8.3 8.1* 8.1*   PROT 5.8* 5.9* 5.5*   LABALBU 3.5 3.3* 3.3*   BILITOT 0.2 0.2 0.3   ALKPHOS 49 48 46   AST 20 20 20   ALT 22 22 21     Lipids: No results found for: CHOL, HDL, TRIG  Glucose:  Recent Labs     01/19/21  1316 01/19/21  1816 01/19/21  2047   POCGLU 99 140* 133*     [id number]:  Lab Results   Component Value Date    LABA1C 7.0 01/17/2021     High Sensitivity TSH:   Lab Results   Component Value Date    TSHHS 0.769 01/19/2021     Free T3: No results found for: FT3  Free T4:No results found for: T4FREE    Echocardiogram Complete 2d With Doppler With Color    Result Date: 1/19/2021  Transthoracic Echocardiography Report (TTE)  Demographics   Patient Name       Brigitte Feliciano   Date of Study       01/19/2021   Date of Birth      1950         Gender              Male   Age                78 year(s)         Race                   Patient Number     [id number]         Room Number         4022   Visit Number       [id number]   Corporate ID       [id number]   Accession Number   [id number] Roddy Gomez                                                            [facility]   Ordering Physician Yair Juarez MD        Physician           Erica BRO  Procedure Type of Study   TTE procedure:ECHOCARDIOGRAM COMPLETE 2D W DOPPLER W COLOR. Procedure Date Date: 01/19/2021 Start: 10:41 AM Study Location: Portable Technical Quality: Adequate visualization Indications:Bradycardia and COVID-19. Patient Status: Routine Height: 66 inches Weight: 166 pounds BSA: 1.85 m2 BMI: 26.79 kg/m2 HR: 56 bpm BP: 105/74 mmHg  Conclusions   Summary  Left ventricular systolic function is normal.  Ejection fraction is visually estimated at 50-55%. Trace aortic regurgitation is noted. No evidence of any pericardial effusion. Mildly dilated ascending aorta (4.1 cm). Signature   ------------------------------------------------------------------  Electronically signed by Terri Diamond MD  (Interpreting physician) on 01/19/2021 at 12:35 PM    V E/E' lateral:  10.82      Xr Chest Portable    Result Date: 1/15/2021  EXAMINATION: ONE XRAY VIEW OF THE CHEST 1/15/2021 11:21 am COMPARISON: None. HISTORY: ORDERING SYSTEM PROVIDED HISTORY: SOB TECHNOLOGIST PROVIDED HISTORY: Reason for exam:->SOB Reason for Exam: SOB FINDINGS: There is evidence of left lung infiltrates and the suggestion also of a subtle right upper lobe infiltrate. These changes are compatible with pneumonia. Mild cardiomegaly. COPD. No active pleural disease. Bilateral perihilar infiltrates worse on the left. Pneumonia suspected.        Scheduled Medicines   Medications:    benzonatate  100 mg Oral TID    insulin lispro  0-6 Units Subcutaneous TID WC    insulin lispro  0-3 Units Subcutaneous Nightly    remdesivir IVPB  100 mg Intravenous Q24H    atorvastatin  40 mg Oral Daily    donepezil  10 mg Oral Nightly    glipiZIDE  2.5 mg Oral BID AC    levothyroxine  50 mcg Oral Daily    losartan  25 mg Oral Daily    metFORMIN  1,000 mg Oral BID WC    pantoprazole  40 mg Oral QAM AC    sodium chloride flush  10 mL Intravenous 2 times per day    enoxaparin  30 mg Subcutaneous BID    methylPREDNISolone  40 mg Intravenous Q12H    vitamin D  6,000 Units Oral Daily    Followed by   Surya Toure ON 1/22/2021] vitamin D  2,000 Units Oral Daily    guaiFENesin  600 mg Oral BID      Infusions:    sodium chloride      sodium chloride           Objective:   Vitals: /78   Pulse 59   Temp 98.1 °F (36.7 °C) (Oral)   Resp 25   Ht 5' 6\" (1.676 m)   Wt 167 lb 6.4 oz (75.9 kg)   SpO2 96%   BMI 27.02 kg/m²   General appearance: alert and cooperative with exam  Neck: no JVD or bruit  Thyroid : Normal lobes   Lungs: Has Vesicular Breath sounds   Heart:  regular rate and rhythm  Abdomen: soft, non-tender; bowel sounds normal; no masses,  no organomegaly  Musculoskeletal: Normal  Extremities: extremities normal, , no edema  Neurologic:  Awake, alert, oriented to name, place and time. Cranial nerves II-XII are grossly intact. Motor is  intact. Sensory is intact. ,  and gait is normal.    Assessment:     Patient Active Problem List:     OPT-02     COPD       Diabetes mellitus       Hyperlipidemia      Hypothyroidism      Plan:     1. Reviewed POC blood glucose . Labs and X ray results   2. Reviewed Current Medicines   3. On Correction bolus Humalog/Insulin regime / and Oral Hypoglycemic drugs   4. Monitor Blood glucose frequently   5. Modified  the dose of Insulin/ other medicines as needed   6. Will follow     .      Wendy Bailey MD

## 2021-01-20 NOTE — PROGRESS NOTES
pulmonary      SUBJECTIVE: sleeping and no sob     OBJECTIVE    VITALS:  BP 94/63   Pulse 50   Temp 98.2 °F (36.8 °C) (Oral)   Resp 21   Ht 5' 6\" (1.676 m)   Wt 167 lb 6.4 oz (75.9 kg)   SpO2 95%   BMI 27.02 kg/m²   HEAD AND FACE EXAM:  No throat injection, no active exudate,no thrush  NECK EXAM;No JVD, no masses, symmetrical  CHEST EXAM; Expansion equal and symmetrical, no masses  LUNG EXAM; Good breath sounds bilaterally. There are expiratory wheezes both lungs, there are crackles at both lung bases  CARDIOVASCULAR EXAM: Positive S1 and S2, no S3 or S4, no clicks ,no murmurs  RIGHT AND LEFT LOWER EXTRIMITY EXAM: No edema, no swelling, no inflamation            LABS   Lab Results   Component Value Date    WBC 4.0 01/20/2021    HGB 12.0 (L) 01/20/2021    HCT 37.3 (L) 01/20/2021    MCV 94.2 01/20/2021     01/20/2021     Lab Results   Component Value Date    CREATININE 0.7 (L) 01/20/2021    BUN 26 (H) 01/20/2021     01/20/2021    K 4.6 01/20/2021     01/20/2021    CO2 20 (L) 01/20/2021     No results found for: INR, PROTIME     No results found for: PHOS   No results for input(s): PH, PO2ART, NDF0YJY, HCO3, BEART, O2SAT in the last 72 hours. Wt Readings from Last 3 Encounters:   01/19/21 167 lb 6.4 oz (75.9 kg)               ASSESMENT  covid pneumonia  Copd  sascha        PLAN  1. cpm  2.  If he is dc then fu as outpt    1/20/2021  Huyen Gale M.D.

## 2021-01-20 NOTE — PLAN OF CARE
Problem: Falls - Risk of:  Goal: Will remain free from falls  Description: Will remain free from falls  1/20/2021 1107 by Perla Irvin LPN  Outcome: Completed  1/19/2021 2125 by Saturnino Gipson RN  Outcome: Ongoing  Goal: Absence of physical injury  Description: Absence of physical injury  1/20/2021 1107 by Perla Irvin LPN  Outcome: Completed  1/19/2021 2125 by Saturnino Gipson RN  Outcome: Ongoing     Problem: Airway Clearance - Ineffective  Goal: Achieve or maintain patent airway  1/20/2021 1107 by ePrla Irvin LPN  Outcome: Completed  1/19/2021 2125 by Saturnino Gipson RN  Outcome: Ongoing     Problem: Gas Exchange - Impaired  Goal: Absence of hypoxia  1/20/2021 1107 by Perla Irvin LPN  Outcome: Completed  1/19/2021 2125 by Saturnino Gipson RN  Outcome: Ongoing  Goal: Promote optimal lung function  1/20/2021 1107 by Perla Irvin LPN  Outcome: Completed  1/19/2021 2125 by Saturnino Gipson RN  Outcome: Ongoing     Problem: Breathing Pattern - Ineffective  Goal: Ability to achieve and maintain a regular respiratory rate  1/20/2021 1107 by Perla Irvin LPN  Outcome: Completed  1/19/2021 2125 by Saturnino Gipson RN  Outcome: Ongoing     Problem:  Body Temperature -  Risk of, Imbalanced  Goal: Ability to maintain a body temperature within defined limits  1/20/2021 1107 by Perla Irvin LPN  Outcome: Completed  1/19/2021 2125 by Saturnino Gipson RN  Outcome: Ongoing  Goal: Will regain or maintain usual level of consciousness  1/20/2021 1107 by Perla Irvin LPN  Outcome: Completed  1/19/2021 2125 by Saturnino Gipson RN  Outcome: Ongoing  Goal: Complications related to the disease process, condition or treatment will be avoided or minimized  1/20/2021 1107 by Perla Irvin LPN  Outcome: Completed  1/19/2021 2125 by Saturnino Gipson RN  Outcome: Ongoing     Problem: Isolation Precautions - Risk of Spread of Infection  Goal: Prevent transmission of infection  1/20/2021 1107 by Cecilio Buck [place], LPN  Outcome: Completed  1/19/2021 2125 by Umu Santiago RN  Outcome: Ongoing     Problem: Nutrition Deficits  Goal: Optimize nutritional status  1/20/2021 1107 by Сергей Alvarez LPN  Outcome: Completed  1/19/2021 2125 by Umu Santiago RN  Outcome: Ongoing     Problem: Risk for Fluid Volume Deficit  Goal: Maintain normal heart rhythm  1/20/2021 1107 by Сергей Alvarez LPN  Outcome: Completed  1/19/2021 2125 by Umu Santiago RN  Outcome: Ongoing  Goal: Maintain absence of muscle cramping  1/20/2021 1107 by Сергей Alvarez LPN  Outcome: Completed  1/19/2021 2125 by Umu Santiago RN  Outcome: Ongoing  Goal: Maintain normal serum potassium, sodium, calcium, phosphorus, and pH  1/20/2021 1107 by Сергей Alvarez LPN  Outcome: Completed  1/19/2021 2125 by Umu Santiago RN  Outcome: Ongoing     Problem: Loneliness or Risk for Loneliness  Goal: Demonstrate positive use of time alone when socialization is not possible  1/20/2021 1107 by Сергей Alvarez LPN  Outcome: Completed  1/19/2021 2125 by Umu Santiago RN  Outcome: Ongoing     Problem: Fatigue  Goal: Verbalize increase energy and improved vitality  1/20/2021 1107 by Сергей Alvarez LPN  Outcome: Completed  1/19/2021 2125 by Umu Santiago RN  Outcome: Ongoing     Problem: Patient Education: Go to Patient Education Activity  Goal: Patient/Family Education  1/20/2021 1107 by Сергей Alvarez LPN  Outcome: Completed  1/19/2021 2125 by Umu Santiago RN  Outcome: Ongoing     Problem: Pain:  Goal: Pain level will decrease  Description: Pain level will decrease  1/20/2021 1107 by Сергей Alvarez LPN  Outcome: Completed  1/19/2021 2125 by Umu Santiago RN  Outcome: Ongoing  Goal: Control of acute pain  Description: Control of acute pain  1/20/2021 1107 by Сергей Alvarez LPN  Outcome: Completed  1/19/2021 2125 by Umu Santiago RN  Outcome: Ongoing  Goal: Control of chronic pain  Description: Control of chronic pain  1/20/2021 1107 by Memo Talavera LPN  Outcome: Completed  1/19/2021 2125 by Savanna Rod RN  Outcome: Ongoing

## 2021-01-20 NOTE — PROGRESS NOTES
Physician Progress Note      PATIENT:               Bo Clark  CSN #:                  [id number]  :                       1950  ADMIT DATE:       1/15/2021 11:08 AM  DISCH DATE:  RESPONDING  PROVIDER #:        KURT SPICER MD          QUERY TEXT:    Hospitalists,    Patient admitted with sepsis due to COVID PNA. If possible, please document in   the progress notes and discharge summary if sepsis was: The medical record reflects the following:  Risk Factors: COVID  Clinical Indicators: Noted sepsis documented in H&P and dropped from   subsequent notes, afebrile, lactate and WBCnormal  Treatment: IV Zithromax & Rocephin, labs    Thank you,  Nita Lyon  Options provided:  -- Sepsis ruled out after study  -- Sepsis confirmed after study  -- Other - I will add my own diagnosis  -- Disagree - Not applicable / Not valid  -- Disagree - Clinically unable to determine / Unknown  -- Refer to Clinical Documentation Reviewer    PROVIDER RESPONSE TEXT:    Sepsis ruled out after study.     Query created by: Andrew Brooks on 2021 12:38 PM      Electronically signed by:  Anita Cushing MD 2021 9:26 AM

## 2021-01-21 ENCOUNTER — CARE COORDINATION (OUTPATIENT)
Dept: CASE MANAGEMENT | Age: 71
End: 2021-01-21

## 2021-01-21 LAB
EKG ATRIAL RATE: 88 BPM
EKG DIAGNOSIS: NORMAL
EKG P AXIS: 14 DEGREES
EKG P-R INTERVAL: 156 MS
EKG Q-T INTERVAL: 358 MS
EKG QRS DURATION: 78 MS
EKG QTC CALCULATION (BAZETT): 433 MS
EKG R AXIS: 10 DEGREES
EKG T AXIS: 21 DEGREES
EKG VENTRICULAR RATE: 88 BPM

## 2021-01-21 NOTE — CARE COORDINATION
[facility] Transitions Initial Follow Up Call    Call within 2 business days of discharge: Yes    Patient: Hans Peralta Patient : 1950   MRN: [id number]  Reason for Admission: Corean Sow Failure  Discharge Date: 21 RARS: Readmission Risk Score: 18  Facility: [address] [place] [address]       Complaint Diagnosis Description Type Department Provider    1/15/21 Shortness of Breath; Cough; Chest Pain COVID-19 . .. ED to Hosp-Admission (Discharged) (ADMITTED) Marcos Lozoya MD; Teresa Rojas. .. Non-face-to-face services provided:  Obtained and reviewed discharge summary and/or continuity of care documents  Education of patient/family/caregiver/guardian to support self-management-Covid19  Assessment and support for treatment adherence and medication management-Decadron, Tessalon, Vit D    Care Transitions 24 Hour Call    Schedule Follow Up Appointment with PCP: Declined  Do you have any ongoing symptoms?: Yes  Patient-reported symptoms: Cough, Shortness of Breath  Interventions for patient-reported symptoms: Other (Comment: Patient denied need)  Do you have a copy of your discharge instructions?: Yes  Do you have all of your prescriptions and are they filled?: Yes  Have you been contacted by a [facility] Pharmacist?: No  Have you scheduled your follow up appointment?: No (Comment: Patient to schedule)  Were you discharged with any Home Care or Post Acute Services: No  Do you feel like you have everything you need to keep you well at home?: Yes  Care Transitions Interventions  No Identified Needs   Home Care Waiver: Declined        DME Assistance: Declined        Follow Up  No future appointments. Bharti Medina Spoke w/ Patient for initial [facility] discharge call. Patient reports \"feel pretty good\". Reports ongoing but improving cough w/ clear mucous, mild sob on exertion. Denies ac resp distress, fever, chills or other Covid19 related sx. Denies need for MD notification. Confirmed copy fo AVS received, reviewed. Patient reports he obtained and is taking Decadron, Vit D and Tessalon as directed. Med education provided. Stressed importance of not skipping doses and completing unless otherwise directed by MD. Bandar Van obtaining 90 day supply of routine, prn meds. Reviewed discharge instructions, medical action plan and Covid19 red flags such as increased shortness of breath, increasing fever and signs of decompensation. Discussed increased risk of blood clots associated w/ Covid19, sx and preventative measures. Patient verbalizes understanding. Discussed exposure protocols and quarantine with CDC Guidelines What to do if you are sick with coronavirus disease 2019.  Patient reports adhering to quarantine guidelines. Has family who is able to drop off any needed supplies or food. Advised to contact [facility]/PCP re: any additional Covid19 questions. Patient follows w/ Dr Viki Aviles and will schedule 7 day f/u appt, denies need for assistance. Advised telehealth appt given [id number] dx. Advised to contact PCP 24/7 re: any health concerns or new/worsening Covid 19 sx. Denies resource needs including hhc, dme, community assistance. HCDM: verified information on file up to date. Patient given information for [employer] and agrees to enroll. Patient's preferred e-mail:  [email or web address]  Patient's preferred phone number: [phone]   Based on Loop alert triggers, patient will be contacted by nurse care manager for worsening symptoms. Note routed to [employer] for enrollment.          Olimpia Dhillon RN

## 2021-02-08 NOTE — PROGRESS NOTES
AM Study Location: Portable Technical Quality: Adequate visualization Indications:Bradycardia and COVID-19. Patient Status: Routine Height: 66 inches Weight: 166 pounds BSA: 1.85 m2 BMI: 26.79 kg/m2 HR: 56 bpm BP: 105/74 mmHg  Conclusions   Summary  Left ventricular systolic function is normal.  Ejection fraction is visually estimated at 50-55%. Trace aortic regurgitation is noted. No evidence of any pericardial effusion. Mildly dilated ascending aorta (4.1 cm). Signature   ------------------------------------------------------------------  Electronically signed by Santos Irvin MD  (Interpreting physician) on 01/19/2021 at 12:35 PM    V E/E' lateral:  10.82      Xr Chest Portable    Result Date: 1/15/2021  EXAMINATION: ONE XRAY VIEW OF THE CHEST 1/15/2021 11:21 am COMPARISON: None. HISTORY: ORDERING SYSTEM PROVIDED HISTORY: SOB TECHNOLOGIST PROVIDED HISTORY: Reason for exam:->SOB Reason for Exam: SOB FINDINGS: There is evidence of left lung infiltrates and the suggestion also of a subtle right upper lobe infiltrate. These changes are compatible with pneumonia. Mild cardiomegaly. COPD. No active pleural disease. Bilateral perihilar infiltrates worse on the left. Pneumonia suspected. Scheduled Medicines   Medications:      Infusions:         Objective:   Vitals: /82   Pulse 59   Temp 98.1 °F (36.7 °C) (Oral)   Resp 23   Ht 5' 6\" (1.676 m)   Wt 167 lb 6.4 oz (75.9 kg)   SpO2 93%   BMI 27.02 kg/m²   General appearance: alert and cooperative with exam  Neck: no JVD or bruit  Thyroid : Normal lobes   Lungs: Has Vesicular Breath sounds   Heart:  regular rate and rhythm  Abdomen: soft, non-tender; bowel sounds normal; no masses,  no organomegaly  Musculoskeletal: Normal  Extremities: extremities normal, , no edema  Neurologic:  Awake, alert, oriented to name, place and time. Cranial nerves II-XII are grossly intact. Motor is  intact. Sensory is intact. ,  and gait is normal.    Assessment:     Patient Active Problem List:     [id number]     COPD       Diabetes mellitus       Hyperlipidemia      Hypothyroidism      Plan:     1. Reviewed POC blood glucose . Labs and X ray results   2. Reviewed Current Medicines   3. On Correction bolus Humalog/Insulin regime / and Oral Hypoglycemic drugs   4. Monitor Blood glucose frequently   5. Modified  the dose of Insulin/ other medicines as needed   6. Will follow     .      Mohan Reddy MD

## 2021-05-05 ENCOUNTER — APPOINTMENT (OUTPATIENT)
Dept: GENERAL RADIOLOGY | Age: 71
End: 2021-05-05
Payer: MEDICARE

## 2021-05-05 ENCOUNTER — APPOINTMENT (OUTPATIENT)
Dept: CT IMAGING | Age: 71
End: 2021-05-05
Payer: MEDICARE

## 2021-05-05 ENCOUNTER — HOSPITAL ENCOUNTER (EMERGENCY)
Age: 71
Discharge: HOME | End: 2021-05-05
Attending: EMERGENCY MEDICINE
Payer: MEDICARE

## 2021-05-05 VITALS
TEMPERATURE: 98.6 F | BODY MASS INDEX: 27.97 KG/M2 | HEART RATE: 53 BPM | RESPIRATION RATE: 17 BRPM | WEIGHT: 174 LBS | DIASTOLIC BLOOD PRESSURE: 78 MMHG | HEIGHT: 66 IN | SYSTOLIC BLOOD PRESSURE: 135 MMHG | OXYGEN SATURATION: 96 %

## 2021-05-05 DIAGNOSIS — M54.2 NECK PAIN: ICD-10-CM

## 2021-05-05 DIAGNOSIS — S80.812A ABRASION OF LEFT LEG, INITIAL ENCOUNTER: Primary | ICD-10-CM
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
## 2021-05-05 LAB
EKG ATRIAL RATE: 54 BPM
EKG DIAGNOSIS: NORMAL
EKG P AXIS: 21 DEGREES
EKG P-R INTERVAL: 152 MS
EKG Q-T INTERVAL: 464 MS
EKG QRS DURATION: 94 MS
EKG QTC CALCULATION (BAZETT): 440 MS
EKG R AXIS: 16 DEGREES
EKG T AXIS: 51 DEGREES
EKG VENTRICULAR RATE: 54 BPM

## 2021-05-05 PROCEDURE — 6360000002 HC RX W HCPCS: Performed by: EMERGENCY MEDICINE

## 2021-05-05 PROCEDURE — 70450 CT HEAD/BRAIN W/O DYE: CPT

## 2021-05-05 PROCEDURE — 90715 TDAP VACCINE 7 YRS/> IM: CPT | Performed by: EMERGENCY MEDICINE

## 2021-05-05 PROCEDURE — 99284 EMERGENCY DEPT VISIT MOD MDM: CPT

## 2021-05-05 PROCEDURE — 73590 X-RAY EXAM OF LOWER LEG: CPT

## 2021-05-05 PROCEDURE — 90471 IMMUNIZATION ADMIN: CPT | Performed by: EMERGENCY MEDICINE

## 2021-05-05 PROCEDURE — 71046 X-RAY EXAM CHEST 2 VIEWS: CPT

## 2021-05-05 PROCEDURE — 93010 ELECTROCARDIOGRAM REPORT: CPT | Performed by: INTERNAL MEDICINE

## 2021-05-05 PROCEDURE — 93005 ELECTROCARDIOGRAM TRACING: CPT | Performed by: EMERGENCY MEDICINE

## 2021-05-05 PROCEDURE — 72125 CT NECK SPINE W/O DYE: CPT

## 2021-05-05 PROCEDURE — 73030 X-RAY EXAM OF SHOULDER: CPT

## 2021-05-05 RX ADMIN — TETANUS TOXOID, REDUCED DIPHTHERIA TOXOID AND ACELLULAR PERTUSSIS VACCINE, ADSORBED 0.5 ML: 5; 2.5; 8; 8; 2.5 SUSPENSION INTRAMUSCULAR at 11:55

## 2021-05-05 NOTE — ED NOTES
Discussed discharge instructions with patient. All questions answered. Patient verbalized understanding.           Nila Mclean RN  05/05/21 1257
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
## 2021-12-04 ENCOUNTER — HOSPITAL ENCOUNTER (EMERGENCY)
Age: 71
Discharge: HOME | End: 2021-12-04
Payer: MEDICARE

## 2021-12-04 ENCOUNTER — APPOINTMENT (OUTPATIENT)
Dept: GENERAL RADIOLOGY | Age: 71
End: 2021-12-04
Payer: MEDICARE

## 2021-12-04 VITALS
OXYGEN SATURATION: 97 % | WEIGHT: 168 LBS | BODY MASS INDEX: 27 KG/M2 | HEART RATE: 66 BPM | RESPIRATION RATE: 16 BRPM | SYSTOLIC BLOOD PRESSURE: 124 MMHG | TEMPERATURE: 98.1 F | HEIGHT: 66 IN | DIASTOLIC BLOOD PRESSURE: 68 MMHG

## 2021-12-04 DIAGNOSIS — M25.532 LEFT WRIST PAIN: Primary | ICD-10-CM

## 2021-12-04 DIAGNOSIS — M25.512 ACUTE PAIN OF LEFT SHOULDER: ICD-10-CM

## 2021-12-04 PROCEDURE — 6370000000 HC RX 637 (ALT 250 FOR IP): Performed by: PHYSICIAN ASSISTANT

## 2021-12-04 PROCEDURE — 73110 X-RAY EXAM OF WRIST: CPT

## 2021-12-04 PROCEDURE — 99282 EMERGENCY DEPT VISIT SF MDM: CPT

## 2021-12-04 PROCEDURE — 73030 X-RAY EXAM OF SHOULDER: CPT

## 2021-12-04 PROCEDURE — 99281 EMR DPT VST MAYX REQ PHY/QHP: CPT

## 2021-12-04 PROCEDURE — 73130 X-RAY EXAM OF HAND: CPT

## 2021-12-04 RX ORDER — HYDROCODONE BITARTRATE AND ACETAMINOPHEN 5; 325 MG/1; MG/1
1 TABLET ORAL ONCE
Status: COMPLETED | OUTPATIENT
Start: 2021-12-04 | End: 2021-12-04

## 2021-12-04 RX ADMIN — HYDROCODONE BITARTRATE AND ACETAMINOPHEN 1 TABLET: 5; 325 TABLET ORAL at 15:24

## 2021-12-04 ASSESSMENT — ENCOUNTER SYMPTOMS
ABDOMINAL PAIN: 0
SHORTNESS OF BREATH: 0
EYE PAIN: 0
RHINORRHEA: 0
DIARRHEA: 0
COUGH: 0
NAUSEA: 0
BACK PAIN: 0
VOMITING: 0

## 2021-12-04 ASSESSMENT — PAIN SCALES - GENERAL: PAINLEVEL_OUTOF10: 10

## 2021-12-04 ASSESSMENT — PAIN DESCRIPTION - LOCATION: LOCATION: WRIST;SHOULDER

## 2021-12-04 ASSESSMENT — PAIN DESCRIPTION - FREQUENCY: FREQUENCY: CONTINUOUS

## 2021-12-04 NOTE — ED PROVIDER NOTES
**ADVANCED PRACTICE PROVIDER, I HAVE EVALUATED THIS PATIENT**        [address] ENCOUNTER      Pt Name: Alex Escobar  LNB:[id number]  [place] 1950  Date of evaluation: 12/4/2021  Provider: Lalitha Valencia PA-C      Chief Complaint:    Chief Complaint   Patient presents with    Wrist Injury    Shoulder Injury    Fall         Nursing Notes, Past Medical Hx, Past Surgical Hx, Social Hx, Allergies, and Family Hx were all reviewed and agreed with or any disagreements were addressed in the HPI.    HPI: (Location, Duration, Timing, Severity, Quality, Assoc Sx, Context, Modifying factors)    Chief Complaint of left upper extremity pain    This is a  73 y.o. male who presents plaint of left wrist and left shoulder pain, after falling down 2 nights ago. He states that he had just returned from dart throwing, he was taken his dogs out, and they had tripped him up, he describes falling forward onto an outstretched left hand. He states that pain and swelling has progressively worsened on his wrist.  He also has some pain into his shoulder it is causing him called a raising it. Pain is worse with movement, and with touch.   He denies any head injury, neck pain, back pain, near syncopal symptoms, loss of consciousness, nausea, vomiting, chest pain, shortness of breath, diaphoresis, lower extremity injuries, recent illness    PastMedical/Surgical History:      Diagnosis Date    [facility]     COPD [facility]chronic obstructive pulmonary disease) [facility]     Diabetes mellitus ([facility]     GERD (gastroesophageal reflux disease)     Hyperlipidemia     Hypertension          Procedure Laterality Date    APPENDECTOMY      BACK SURGERY      HERNIA REPAIR      JOINT REPLACEMENT      STOMACH SURGERY         Medications:  Discharge Medication List as of 12/4/2021  4:46 PM      CONTINUE these medications which have NOT CHANGED Details   vitamin D 25 MCG (1000 UT) CAPS Take 2 capsules by mouth daily, Disp-30 capsule, R-0Normal      HYDROcodone-acetaminophen (NORCO)  MG per tablet Take 1 tablet by mouth every 4 hours as needed for Pain. Historical Med      omeprazole (PRILOSEC) 20 MG delayed release capsule Take 20 mg by mouth 2 times dailyHistorical Med      metFORMIN (GLUCOPHAGE) 1000 MG tablet Take 1,000 mg by mouth 2 times daily (with meals)Historical Med      glipiZIDE (GLUCOTROL) 5 MG tablet Take 2.5 mg by mouth 2 times daily (before meals)Historical Med      atorvastatin (LIPITOR) 80 MG tablet Take 40 mg by mouth dailyHistorical Med      meclizine (ANTIVERT) 25 MG CHEW Take 25 mg by mouth 2 times daily as neededHistorical Med      levothyroxine (SYNTHROID) 50 MCG tablet Take 50 mcg by mouth DailyHistorical Med      vitamin D (CHOLECALCIFEROL) 25 MCG (1000 UT) TABS tablet Take 1,000 Units by mouth dailyHistorical Med      losartan (COZAAR) 25 MG tablet Take 25 mg by mouth dailyHistorical Med      donepezil (ARICEPT) 10 MG tablet Take 10 mg by mouth nightlyHistorical Med      Albuterol Sulfate (PROAIR HFA IN) Inhale 2 puffs into the lungs 2 times dailyHistorical Med               Review of Systems:  (2-9 systems needed)  Review of Systems   Constitutional: Negative for chills and fever. HENT: Negative for congestion and rhinorrhea. Eyes: Negative for pain and visual disturbance. Respiratory: Negative for cough and shortness of breath. Cardiovascular: Negative for chest pain and leg swelling. Gastrointestinal: Negative for abdominal pain, diarrhea, nausea and vomiting. Genitourinary: Negative for dysuria and hematuria. Musculoskeletal: Positive for arthralgias. Negative for back pain, gait problem, joint swelling, myalgias and neck pain. Skin: Negative for rash and wound. Neurological: Negative for dizziness and light-headedness.        \"Positives and Pertinent negatives as per HPI\"    Physical Exam:  Physical Exam  Vitals and nursing note reviewed. Constitutional:       Appearance: Normal appearance. He is well-developed. He is not ill-appearing or diaphoretic. HENT:      Head: Normocephalic and atraumatic. Right Ear: External ear normal.      Left Ear: External ear normal.      Nose: Nose normal.   Eyes:      General:         Right eye: No discharge. Left eye: No discharge. Pulmonary:      Effort: Pulmonary effort is normal. No respiratory distress. Breath sounds: No stridor. Musculoskeletal:      Right shoulder: Normal.      Left shoulder: No deformity, effusion, tenderness, bony tenderness or crepitus. Decreased range of motion. Right elbow: Normal.      Left elbow: Normal.      Right forearm: Normal.      Left forearm: Normal.      Right wrist: No bony tenderness. Left wrist: Swelling, bony tenderness and snuff box tenderness present. No deformity or crepitus. Decreased range of motion. Normal pulse. Left hand: Tenderness present. No swelling, deformity, lacerations or bony tenderness. Decreased range of motion. There is no disruption of two-point discrimination. Normal capillary refill. Normal pulse. Cervical back: Normal, normal range of motion and neck supple. No bony tenderness. No pain with movement. Normal range of motion. Thoracic back: No bony tenderness. Lumbar back: No bony tenderness. Right lower leg: No edema. Left lower leg: No edema. Skin:     General: Skin is warm and dry. Coloration: Skin is not pale. Neurological:      General: No focal deficit present. Mental Status: He is alert and oriented to person, place, and time.    Psychiatric:         Mood and Affect: Mood normal.         Behavior: Behavior normal.         MEDICAL DECISION MAKING    Vitals:    Vitals:    12/04/21 1221   BP: 124/68   Pulse: 66   Resp: 16   Temp: 98.1 °F (36.7 °C)   TempSrc: Oral   SpO2: 97%   Weight: 168 lb (76.2 kg)   Height: 5' 6\" (1.676 m) LABS:Labs Reviewed - No data to display     Remainder of labs reviewed and were negative at this time or not returned at the time of this note. RADIOLOGY:   Non-plain film images such as CT, Ultrasound and MRI are read by the radiologist. Teetee Acuña PA-C have directly visualized the radiologic plain film image(s) with the below findings:      Interpretation per the Radiologist below, if available at the time of this note:    XR WRIST LEFT (MIN 3 VIEWS)   Final Result   Left wrist: Circumferential soft tissue swelling. No acute bony   abnormalities identified. Left hand: No acute abnormality detected. XR SHOULDER LEFT (MIN 2 VIEWS)   Final Result   No acute abnormality. XR HAND LEFT (MIN 3 VIEWS)   Final Result   Left wrist: Circumferential soft tissue swelling. No acute bony   abnormalities identified. Left hand: No acute abnormality detected. No results found. MEDICAL DECISION MAKING / ED COURSE:      PROCEDURES:   Procedures    None    Patient was given:  Medications   HYDROcodone-acetaminophen (NORCO) 5-325 MG per tablet 1 tablet (1 tablet Oral Given 12/4/21 1524)       Patient present with above HPI. On my examination he is alert and oriented no acute distress. He does have some noted left-sided snuffbox tenderness, swelling, decreased range of motion. Limited range of motion of left shoulder. Otherwise extremity exam appear to be unremarkable with no evidence of any head injury, no neck pain. Analgesics imaging ordered. X-rays show no acute fracture or dislocation. However on examination, he does have some exquisite bony tenderness. There is no noted erythema. No induration or fluctuation. Given mechanism of injury, vitals, less concerning for septic arthritis. Possible occult fracture scaphoid fracture. Will have patient splint, follow-up with orthopedics for repeat evaluation and imaging.   Let was applied by ED tech I did do a post application examination no neurovascular deficits. Patient did receive analgesics here. OARRS reviewed. He does have active prescription pain medication prescribed by his outpatient provider, and patient was advised to utilize those if needed for pain. Patient verbalized understanding was agreeable to the plan. The patient tolerated their visit well. I evaluated the patient. The physician was available for consultation as needed. The patient and / or the family were informed of the results of any tests, a time was given to answer questions, a plan was proposed and they agreed with plan. CLINICAL IMPRESSION:  1. Left wrist pain    2.  Acute pain of left shoulder        DISPOSITION        PATIENT REFERRED TO:  Mireya Mahajan MD  [address] [phone]    In 1 week  For reevaluation      DISCHARGE MEDICATIONS:  Discharge Medication List as of 12/4/2021  4:46 PM          DISCONTINUED MEDICATIONS:  Discharge Medication List as of 12/4/2021  4:46 PM                 (Please note the MDM and HPI sections of this note were completed with a voice recognition program.  Efforts were made to edit the dictations but occasionally words are mis-transcribed.)    Electronically signed, Sandrine Lindsay PA-C,          Sandrine Lindsay PA-C  12/04/21 0622

## 2022-12-30 ENCOUNTER — HOSPITAL ENCOUNTER (OUTPATIENT)
Dept: PHYSICAL THERAPY | Age: 72
Setting detail: THERAPIES SERIES
Discharge: HOME | End: 2022-12-30
Payer: OTHER GOVERNMENT

## 2022-12-30 PROCEDURE — 97535 SELF CARE MNGMENT TRAINING: CPT

## 2022-12-30 PROCEDURE — 97162 PT EVAL MOD COMPLEX 30 MIN: CPT

## 2022-12-30 PROCEDURE — 97110 THERAPEUTIC EXERCISES: CPT

## 2022-12-30 ASSESSMENT — PAIN DESCRIPTION - FREQUENCY: FREQUENCY: CONTINUOUS

## 2022-12-30 ASSESSMENT — PAIN DESCRIPTION - ONSET: ONSET: ON-GOING

## 2022-12-30 ASSESSMENT — PAIN - FUNCTIONAL ASSESSMENT: PAIN_FUNCTIONAL_ASSESSMENT: PREVENTS OR INTERFERES WITH MANY ACTIVE NOT PASSIVE ACTIVITIES

## 2022-12-30 ASSESSMENT — PAIN SCALES - GENERAL: PAINLEVEL_OUTOF10: 7

## 2022-12-30 ASSESSMENT — PAIN DESCRIPTION - PAIN TYPE: TYPE: SURGICAL PAIN

## 2022-12-30 ASSESSMENT — PAIN DESCRIPTION - DESCRIPTORS: DESCRIPTORS: ACHING;SHARP

## 2022-12-30 ASSESSMENT — PAIN DESCRIPTION - LOCATION: LOCATION: SHOULDER

## 2022-12-30 ASSESSMENT — PAIN DESCRIPTION - ORIENTATION: ORIENTATION: LEFT

## 2022-12-30 NOTE — FLOWSHEET NOTE
Outpatient Physical Therapy  [place]           [x] Phone: [phone]   Fax: [phone]  [facility]           [] Phone: [phone]   Fax: [phone]        Physical Therapy Daily Treatment Note  Date:  2022    Patient Name:  Emmy Cisneros    :  1950  MRN: [id number]  Restrictions/Precautions: See soft chart     Diagnosis:   Pain in left shoulder [M25.512]    Date of Injury/Surgery: RC repair left shoulder on 22  Treatment Diagnosis:  L shoulder pain, impaired ROM/strength, impaired function  Insurance/Certification information:  [place] - 15 visits  Referring Physician:  Sha Lowry MD     PCP: Roma Carr MD  Next Doctor Visit:  Unknown  Plan of care signed (Y/N):  Pending  Outcome Measure: Quick Dash 84% disability  Visit# / total visits:   1/15  Pain level: 7/10   Goals:     Patient goals: Return full function/use of LUE. Short term goals  Time Frame for Short term goals: In 4 weeks, patient will  demonstrate compliance and independence w/HEP. adhere to all precautions for L shoulder RC repair to avoid re-injury of shoulder. Long Term Goals  Time Frame for Long Term Goals: In 8 weeks, patient will  demonstrate compliance and independence w/HEP and shoulder precautions. gain full PROM L shoulder for advancement toward functional use of shoulder for adls, daily activities. achieve full AROM by end of week 8 post-operative to allow advancement to intermediate Phase/Phase 3 for a gradual return to functional activities. begin rotator cuff isometrics for graded strengthening of healing tissue. Summary of Evaluation:  Assessment: Pt is a 81 y.o. male w/DX L shoulder pain. Pt reports having chronic L shoulder pain after breaking his \"collar bone\" in . Then recently, slipped into a hole in the ground and caught himself w/his left hand. Since then, increased pain noticed and was identified that there was an partial RC tear/tendonitis.   Now, pt is s/p L RC repair on 12/12/22. PLOF:  Prior to this surgery, was independent w/all adls w/o left shoulder pain, was able to lift a good amount of weight overhead, had a 50# limit on lifting d/t hx back surgery. 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 Patient will benefit from physical therapy to improve strength, ROM and function of LUE in a graded fashion w/adhearance to precautions and protocols. Subjective:  See eval         Any changes in Ambulatory Summary Sheet? None        Objective:  See eval           Exercises: (No more than 4 columns)   Exercise/Equipment Date 12/30/22 #1 Date Date           WARM UP                     TABLE                                       STANDING                                                     PROPRIOCEPTION                                    MODALITIES                      Other Therapeutic Activities/Education:        Home Exercise Program:    Access Code: [email or web address]  URL: [email or web address]  Date: 12/30/2022  Prepared by: Gene Raul    Exercises  Seated Scapular Retraction - 1 x daily - 7 x weekly - 3 sets - 10 reps  Seated Elbow Flexion and Extension AROM - 1 x daily - 7 x weekly - 3 sets - 10 reps  Seated Shoulder Flexion Self PROM - 1 x daily - 7 x weekly - 3 sets - 10 reps  Seated Gripping Towel - 1 x daily - 7 x weekly - 3 sets - 10 reps      Manual Treatments:    NA    Modalities:    NA    Communication with other providers:    POC faxed 12/30/22    Assessment:  (Response towards treatment session) (Pain Rating)  End pain = NC    Assessment: Pt is a 81 y.o. male w/DX L shoulder pain. Pt reports having chronic L shoulder pain after breaking his \"collar bone\" in 2005. Then recently, slipped into a hole in the ground and caught himself w/his left hand.   Since then, increased pain noticed and was identified that there was an partial RC tear/tendonitis. Now, pt is s/p L RC repair on 12/12/22. PLOF:  Prior to this surgery, was independent w/all adls w/o left shoulder pain, was able to lift a good amount of weight overhead, had a 50# limit on lifting d/t hx back surgery. 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 Patient will benefit from physical therapy to improve strength, ROM and function of LUE in a graded fashion w/adhearance to precautions and protocols.       Plan for Next Session:   Specific Instructions for Next Treatment: shoulder protocol in soft chart    Time In / Time Out:    1300/1350       If BWC Please Indicate Time In/Out/Total Time  CPT Code Time in Time out Total Time                                                            Total for session             Timed Code/Total Treatment Minutes:  30'/50'  TE 15' (1), ADL 15' (1)      Next Progress Note due:  10 visits      Plan of Care Interventions:  [x] Therapeutic Exercise  [x] Modalities:  [x] Therapeutic Activity     [x] Ultrasound  [x] Estim  [] Gait Training      [] Cervical Traction [] Lumbar Traction  [x] Neuromuscular Re-education    [] Cold/hotpack [] Iontophoresis   [x] Instruction in HEP      [x] Vasopneumatic   [x] Dry Needling    [x] Manual Therapy               [] Aquatic Therapy              Electronically signed by:  Randall Prasad, PT, 12/30/2022, 3:59 PM

## 2022-12-30 NOTE — PLAN OF CARE
[address] PHYSICAL THERAPY  [facility] [address], [address] [id number]  Dept: [phone]  Dept Fax: [phone]  Loc: [phone]    PHYSICAL THERAPY PLAN OF CARE: INITIAL EVALUATION    Patient: Merle Cain (42 y.o. male)   Examination Date:   Plan of Care Certification Period: 2022 to  23 up to 15 visits      :  1950  MRN: [id number]  CSN: [id number]   Insurance: Payor: [employer] / Plan: [employer] / Product Type: *No Product type* /   Insurance ID: [id number] - (Medicare) Secondary Insurance (if applicable): MEDICARE   Referring Physician: Rivera Sorto MD     PCP: Anirudh Dave MD Visits to Date/Visits Approved:   / 15    No Show/Cancelled Appts:   /       Medical Diagnosis: Pain in left shoulder [M25.512]    Treatment Diagnosis: L shoulder pain, impaired ROM/strength, impaired function       ASSESSMENT     Impression: Assessment: Pt is a 81 y.o. male w/DX L shoulder pain. Pt reports having chronic L shoulder pain after breaking his \"collar bone\" in . Then recently, slipped into a hole in the ground and caught himself w/his left hand. Since then, increased pain noticed and was identified that there was an partial RC tear/tendonitis. Now, pt is s/p L RC repair on 22. PLOF:  Prior to this surgery, was independent w/all adls w/o left shoulder pain, was able to lift a good amount of weight overhead, had a 50# limit on lifting d/t hx back surgery. CURRENT LOF:  No lifting >5#, no raising LUE over head, sling came off on 22 w/surgeon approval per pt.)  Having difficulty w/bathing and dressing (wife assisting), homemaking limitations d/t wt restrictions, pain. Patient will benefit from physical therapy to improve strength, ROM and function of LUE in a graded fashion w/adhearance to precautions and protocols.     Body Structures, Functions, Activity Limitations Requiring Skilled Therapeutic Intervention: Decreased functional mobility , Decreased ADL status, Decreased ROM, Decreased strength, Increased pain    Statement of Medical Necessity: Physical Therapy is both indicated and medically necessary as outlined in the POC to increase the likelihood of meeting the functionally related goals stated below. Patient's Activity Tolerance: Patient tolerated evaluation without incident      Patient's rehabilitation potential/prognosis is considered to be: Good    Factors which may impact rehabilitation potential include: Cognitive function        GOALS   Patient Goal(s): Return full function/use of LUE. Short Term Goals Completed by In 4 weeks, patient will Goal Status   demonstrate compliance and independence w/HEP. adhere to all precautions for L shoulder RC repair to avoid re-injury of shoulder. Long Term Goals Completed by In 8 weeks, patient will Goal Status   demonstrate compliance and independence w/HEP and shoulder precautions. gain full PROM L shoulder for advancement toward functional use of shoulder for adls, daily activities. achieve full AROM by end of week 8 post-operative to allow advancement to intermediate Phase/Phase 3 for a gradual return to functional activities. begin rotator cuff isometrics for graded strengthening of healing tissue.                                               TREATMENT PLAN       Requires PT Follow-Up: Yes  Specific Instructions for Next Treatment: shoulder protocol in soft chart    Pt. actively involved in establishing Plan of Care and Goals: Yes  Patient/ Caregiver education and instruction: Goals, PT Role, Plan of Care, Home Exercise Program, Precautions             Treatment may include any combination of the following: Current Treatment Recommendations: Endurance training, Strengthening, ROM, ADL/Self-care training, Manual, Neuromuscular re-education, Pain management, Home exercise program, Patient/Caregiver education & training, Therapeutic activities, Modalities, Dry needling  Modalities: E-stim - unattended, Ultrasound, Heat/Cold, Vasopneumatic Device (DNT)     Frequency / Duration:  Patient to be seen 1-2x/wk for 8 weeks up to 15 visits max weeks       Patient Status: [x] Continue / Initiate Plan of Care     Signature: Electronically signed by Erik Field PT on 12/30/2022 at 3:58 PM.     If you have any questions or concerns, please don't hesitate to call.   Thank you for your referral!

## 2022-12-30 NOTE — PROGRESS NOTES
Physical Therapy: Initial Evaluation    Patient: Shannan Duncan (89 y.o. male)   Examination Date:   Plan of Care Certification Period: 2022 to  23 (up to 15 visits)      :  1950 ;    Confirmed: Yes MRN: [id number]  CSN: [id number]   Insurance: Payor: Carmen Moya / Plan: Carmen Moya / Product Type: *No Product type* /   Insurance ID: [id number] - (Medicare) Secondary Insurance (if applicable):  MEDICARE   Referring Physician: Roger Montilla MD     PCP: Pietro Montoya MD Visits to Date/Visits Approved:   1/ 15    No Show/Cancelled Appts:   /       Medical Diagnosis: Pain in left shoulder [M25.512]    Treatment Diagnosis: L shoulder pain, impaired ROM/strength, impaired function     PERTINENT MEDICAL HISTORY      Self reported health status[de-identified] Good    Medical History:     Past Medical History:   Diagnosis Date    Alzheimer disease ([facility] [address]     COPD (chronic obstructive pulmonary disease) ([facility] [address]     Diabetes mellitus ([facility] [address]     GERD (gastroesophageal reflux disease)     Hyperlipidemia     Hypertension      Surgical History:   Past Surgical History:   Procedure Laterality Date    APPENDECTOMY      BACK SURGERY      HERNIA REPAIR      JOINT REPLACEMENT      STOMACH SURGERY         Medications:   Current Outpatient Medications:     vitamin D 25 MCG (1000 UT) CAPS, Take 2 capsules by mouth daily, Disp: 30 capsule, Rfl: 0    HYDROcodone-acetaminophen (NORCO)  MG per tablet, Take 1 tablet by mouth every 4 hours as needed for Pain., Disp: , Rfl:     omeprazole (PRILOSEC) 20 MG delayed release capsule, Take 20 mg by mouth 2 times daily, Disp: , Rfl:     metFORMIN (GLUCOPHAGE) 1000 MG tablet, Take 1,000 mg by mouth 2 times daily (with meals), Disp: , Rfl:     glipiZIDE (GLUCOTROL) 5 MG tablet, Take 2.5 mg by mouth 2 times daily (before meals), Disp: , Rfl:     atorvastatin (LIPITOR) 80 MG tablet, Take 40 mg by mouth daily, Disp: , Rfl:     meclizine (ANTIVERT) 25 MG CHEW, Take 25 mg by mouth 2 times concepts?: Listening, Reading, Demonstration, Pictures/Videos     Pain Screening   Pain Screening  Patient Currently in Pain: Yes  Pain Assessment: 0-10  Pain Level: 7  Best Pain Level: 4  Worst Pain Level: 8 (if moves wrong or lies on left side)  Pain Type: Surgical pain  Pain Location: Shoulder  Pain Orientation: Left  Pain Radiating Towards: toward elbow  Pain Descriptors: Aching, Sharp  Pain Frequency: Continuous  Pain Onset: On-going  Functional Pain Assessment: Prevents or interferes with many active not passive activities  Aggravating factors: Reaching, Lifting, Carrying, Laying on involved side  Pain Management/Relieving Factors: Rest, Laying supine, Medications    Functional Status    Dominant Hand: : Right    Social History:  Social History  Lives With: Spouse, Daughter (grandson)  Type of Home: House  Home Layout: Two level  Home Access: Stairs to enter with rails  Entrance Stairs - Rails: Right  Entrance Stairs - Number of Steps: flight to [employer] Equipment: 3-in-1 commode                 OBJECTIVE EXAMINATION     Review of Systems:  Overall Orientation Status: Within Functional Limits  Patient affect[de-identified] Normal  Follows Commands: Within Functional Limits    Observations:  General Observations  General Observations: Yes  Description: no sling LUE, using LLE minimally    Palpation:   Left Shoulder Palpation: TTP bicepital groove, AC joint soft tissue, UT    Neuro Screen:  Not Assessed d/t surgical precautions/protocol  Left AROM  Right AROM            WFL        Left PROM  Right PROM      General PROM UE: PROM Assessed  PROM LUE (degrees)  L Shoulder Flex  (0-180): 0-130  L Shoulder Ext  (0-45): 0-30  L Shoulder ABduction (0-180): 0-100  L Shoulder Int Rotation  (0-70): 0-50 in plane of scaption  L Shoulder Ext Rotation  (0-90): 0-30 in plane of scaption General PROM UE: PROM Assessed        Left Strength  Right Strength      Strength Other  Other:  RUE 65.2#, LUE 42.1#  Other:  RUE 65.2#, LUE 42.1#  Strength LUE  L Shoulder Flexion: NT  L Shoulder Extension: NT  L Shoulder ABduction: NT  L Shoulder Internal Rotation: NT  L Shoulder External Rotation: NT  L Elbow Flexion: 3/5  L Elbow Extension: 3/5    WFL     Additional Finding(s) (if applicable):    Strength Other  Other:  RUE 65.2#, LUE 42.1#  Other:  RUE 65.2#, LUE 42.1#    Quick Dash = 84% disability       ASSESSMENT     Impression: Assessment: Pt is a 81 y.o. male w/DX L shoulder pain. Pt reports having chronic L shoulder pain after breaking his \"collar bone\" in 2005. Then recently, slipped into a hole in the ground and caught himself w/his left hand. Since then, increased pain noticed and was identified that there was an partial RC tear/tendonitis. Now, pt is s/p L RC repair on 12/12/22. PLOF:  Prior to this surgery, was independent w/all adls w/o left shoulder pain, was able to lift a good amount of weight overhead, had a 50# limit on lifting d/t hx back surgery. 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 Patient will benefit from physical therapy to improve strength, ROM and function of LUE in a graded fashion w/adhearance to precautions and protocols. Body Structures, Functions, Activity Limitations Requiring Skilled Therapeutic Intervention: Decreased functional mobility , Decreased ADL status, Decreased ROM, Decreased strength, Increased pain    Statement of Medical Necessity: Physical Therapy is both indicated and medically necessary as outlined in the POC to increase the likelihood of meeting the functionally related goals stated below.      Patient's Activity Tolerance: Patient tolerated evaluation without incident      Patient's rehabilitation potential/prognosis is considered to be: Good    Factors which may impact rehabilitation potential include: Cognitive function        GOALS   Patient Goal(s): Return full function/use of LUE. Short Term Goals Completed by In 4 weeks, patient will Goal Status   demonstrate compliance and independence w/HEP. adhere to all precautions for L shoulder RC repair to avoid re-injury of shoulder. Long Term Goals Completed by In 8 weeks, patient will Goal Status   demonstrate compliance and independence w/HEP and shoulder precautions. gain full PROM L shoulder for advancement toward functional use of shoulder for adls, daily activities. achieve full AROM by end of week 8 post-operative to allow advancement to intermediate Phase/Phase 3 for a gradual return to functional activities. begin rotator cuff isometrics for graded strengthening of healing tissue.                                               TREATMENT PLAN       Requires PT Follow-Up: Yes  Specific Instructions for Next Treatment: shoulder protocol in soft chart    Pt. actively involved in establishing Plan of Care and Goals: Yes  Patient/ Caregiver education and instruction: Goals, PT Role, Plan of Care, Home Exercise Program, Precautions             Treatment may include any combination of the following: Current Treatment Recommendations: Endurance training, Strengthening, ROM, ADL/Self-care training, Manual, Neuromuscular re-education, Pain management, Home exercise program, Patient/Caregiver education & training, Therapeutic activities, Modalities, Dry needling  Modalities: E-stim - unattended, Ultrasound, Heat/Cold, Vasopneumatic Device (DNT)     Frequency / Duration:  Patient to be seen 1-2x/wk for 8 weeks up to 15 visits max weeks      Eval Complexity: Overall Evaluation : Medium  Decision Making: Medium Complexity  History: Personal Factors and/or Comorbidities Impacting POC: Medium  History: See above  Examination of body system(s) including body structures and functions, activity limitations, and/or participation restrictions: Medium  Exam: See above  Clinical Presentation: Medium  Clinical Presentation: Evolving     Therapist Signature: Mariel Graham, PT    Date: 56/90/0725     I certify that the above Therapy Services are being furnished while the patient is under my care. I agree with the treatment plan and certify that this therapy is necessary. [de-identified] Signature:  ___________________________   Date:_______                                                                   Brady Floyd MD        Physician Comments: _______________________________________________    Please sign and return to [id number]  [facility]. Please fax to the location listed below.  Lorena Malone for this referral!    [address] [facility] [address], [address] [address]  Dept: [phone]  Dept Fax: [phone]  Loc: [phone]       POC NOTE

## 2023-01-09 ENCOUNTER — HOSPITAL ENCOUNTER (OUTPATIENT)
Dept: PHYSICAL THERAPY | Age: 73
Setting detail: THERAPIES SERIES
Discharge: HOME | End: 2023-01-09
Payer: MEDICARE

## 2023-01-09 PROCEDURE — 97140 MANUAL THERAPY 1/> REGIONS: CPT

## 2023-01-09 PROCEDURE — 97016 VASOPNEUMATIC DEVICE THERAPY: CPT

## 2023-01-09 NOTE — FLOWSHEET NOTE
Outpatient Physical Therapy  [place]           [x] Phone: [phone]   Fax: [phone]  [facility]           [] Phone: [phone]   Fax: [phone]        Physical Therapy Daily Treatment Note  Date:  2023    Patient Name:  Dax Del Valle    :  1950  MRN: [id number]  Restrictions/Precautions: See soft chart     Diagnosis:   Pain in left shoulder [M25.512]    Date of Injury/Surgery: RC repair left shoulder on 22  Treatment Diagnosis:  L shoulder pain, impaired ROM/strength, impaired function  Insurance/Certification information:  [place] - 15 [place]  Referring Physician:  Sho Rodriguez MD     PCP: Damaris Jeong MD  Next Doctor Visit:  Unknown  Plan of care signed (Y/N):  Pending  Outcome Measure: Quick Dash 84% disability  Visit# / total visits:   2/15  Pain level: 5/10 sore mid arm. Goals:     Patient goals: Return full function/use of LUE. Short term goals  Time Frame for Short term goals: In 4 weeks, patient will  demonstrate compliance and independence w/HEP. adhere to all precautions for L shoulder RC repair to avoid re-injury of shoulder. Long Term Goals  Time Frame for Long Term Goals: In 8 weeks, patient will  demonstrate compliance and independence w/HEP and shoulder precautions. gain full PROM L shoulder for advancement toward functional use of shoulder for adls, daily activities. achieve full AROM by end of week 8 post-operative to allow advancement to intermediate Phase/Phase 3 for a gradual return to functional activities. begin rotator cuff isometrics for graded strengthening of healing tissue. Summary of Evaluation:  Assessment: Pt is a 81 y.o. male w/DX L shoulder pain. Pt reports having chronic L shoulder pain after breaking his \"collar bone\" in . Then recently, slipped into a hole in the ground and caught himself w/his left hand. Since then, increased pain noticed and was identified that there was an partial RC tear/tendonitis.   Now, pt is s/p L RC repair on 12/12/22. PLOF:  Prior to this surgery, was independent w/all adls w/o left shoulder pain, was able to lift a good amount of weight overhead, had a 50# limit on lifting d/t hx back surgery. 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 Patient will benefit from physical therapy to improve strength, ROM and function of LUE in a graded fashion w/adhearance to precautions and protocols. Subjective:  Pt stated his dog jumped on his arm so it's sore. He is doing his HEP. He had to stop using the ball because it made his arthritis act up. Any changes in Ambulatory Summary Sheet? None        Objective:      PROM  Flexion 148°  Abduction 110 °  ER 45°  IR 60 °      Exercises: (No more than 4 columns)   Exercise/Equipment Date 12/30/22 #1 1/9/23 #2 Date       Week 4    WARM UP         Manual  PROM all dir. TABLE                                       STANDING                                                     PROPRIOCEPTION                                    MODALITIES                      Other Therapeutic Activities/Education:  Reviewed precautions for week 4. Home Exercise Program:    Access Code: [email or web address]  URL: [email or web address]  Date: 12/30/2022  Prepared by: Alba Band    Exercises  Seated Scapular Retraction - 1 x daily - 7 x weekly - 3 sets - 10 reps  Seated Elbow Flexion and Extension AROM - 1 x daily - 7 x weekly - 3 sets - 10 reps  Seated Shoulder Flexion Self PROM - 1 x daily - 7 x weekly - 3 sets - 10 reps  Seated Gripping Towel - 1 x daily - 7 x weekly - 3 sets - 10 reps      Manual Treatments:  PROM all direction within pain free range    Modalities:  Vaso 10' L shoulder in seated    Communication with other providers:    POC faxed 12/30/22    Assessment:  (Response towards treatment session) (Pain Rating)  Pt demonstrated good tolerance to tx with manual therapy with increased PROM. Progressing per protocol. Pt would continue to benefit from skilled therapy interventions to address remaining impairments, improve mobility and strength and progress toward goal completion while reducing risk for re-injury or further decline.   End pain = 4/10 pain post vaso    Assessment: Pt is a 72 y.o. male w/DX L shoulder pain.Pt reports having chronic L shoulder pain after breaking his \"collar bone\" in 2005.  Then recently, slipped into a hole in the ground and caught himself w/his left hand.  Since then, increased pain noticed and was identified that there was an partial RC tear/tendonitis.  Now, pt is s/p L RC repair on 12/12/22.  PLOF:  Prior to this surgery, was independent w/all adls w/o left shoulder pain, was able to lift a good amount of weight overhead, had a 50# limit on lifting d/t hx back surgery.  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  Patient will benefit from physical therapy to improve strength, ROM and function of LUE in a graded fashion w/adhearance to precautions and protocols.      Plan for Next Session:   Specific Instructions for Next Treatment: shoulder protocol in soft chart    Time In / Time Out:    1330/1415      Timed Code/Total Treatment Minutes:  30'/10'  2 MT 1 vaso    Next Progress Note due:  10 visits      Plan of Care Interventions:  [x] Therapeutic Exercise  [x] Modalities:  [x] Therapeutic Activity     [x] Ultrasound  [x] Estim  [] Gait Training      [] Cervical Traction [] Lumbar Traction  [x] Neuromuscular Re-education    [] Cold/hotpack [] Iontophoresis   [x] Instruction in HEP      [x] Vasopneumatic   [x] Dry Needling    [x] Manual Therapy               [] Aquatic Therapy           Electronically signed by:  Maricel Jama PTA, 1/9/2023, 1:32 PM

## 2023-01-13 ENCOUNTER — HOSPITAL ENCOUNTER (OUTPATIENT)
Dept: PHYSICAL THERAPY | Age: 73
Setting detail: THERAPIES SERIES
Discharge: HOME | End: 2023-01-13
Payer: MEDICARE

## 2023-01-13 PROCEDURE — 97140 MANUAL THERAPY 1/> REGIONS: CPT

## 2023-01-13 PROCEDURE — 97016 VASOPNEUMATIC DEVICE THERAPY: CPT

## 2023-01-13 NOTE — FLOWSHEET NOTE
Outpatient Physical Therapy  [place]           [x] Phone: [phone]   Fax: [phone]  [place]           [] Phone: [phone]   Fax: [phone]        Physical Therapy Daily Treatment Note  Date:  2023    Patient Name:  Guero Call    :  1950  MRN: [id number]  Restrictions/Precautions: See soft chart     Diagnosis:   Pain in left shoulder [M25.512]    Date of Injury/Surgery: RC repair left shoulder on 22  Treatment Diagnosis:  L shoulder pain, impaired ROM/strength, impaired function  Insurance/Certification information:  [place] - 15 visits  Referring Physician:  Autumn Spencer MD     PCP: Sabrina Jama MD  Next Doctor Visit:  Unknown  Plan of care signed (Y/N):  Pending  Outcome Measure: Quick Dash 84% disability  Visit# / total visits:   3/15  Pain level: 6/10 sore proximal arm   Goals:     Patient goals: Return full function/use of LUE. Short term goals  Time Frame for Short term goals: In 4 weeks, patient will  demonstrate compliance and independence w/HEP. adhere to all precautions for L shoulder RC repair to avoid re-injury of shoulder. Long Term Goals  Time Frame for Long Term Goals: In 8 weeks, patient will  demonstrate compliance and independence w/HEP and shoulder precautions. gain full PROM L shoulder for advancement toward functional use of shoulder for adls, daily activities. achieve full AROM by end of week 8 post-operative to allow advancement to intermediate Phase/Phase 3 for a gradual return to functional activities. begin rotator cuff isometrics for graded strengthening of healing tissue. Summary of Evaluation:  Assessment: Pt is a 81 y.o. male w/DX L shoulder pain. Pt reports having chronic L shoulder pain after breaking his \"collar bone\" in . Then recently, slipped into a hole in the ground and caught himself w/his left hand. Since then, increased pain noticed and was identified that there was an partial RC tear/tendonitis.   Now, pt is s/p L RC repair on 12/12/22. PLOF:  Prior to this surgery, was independent w/all adls w/o left shoulder pain, was able to lift a good amount of weight overhead, had a 50# limit on lifting d/t hx back surgery. 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 Patient will benefit from physical therapy to improve strength, ROM and function of LUE in a graded fashion w/adhearance to precautions and protocols. Subjective:  Pt stated he did his exercises before coming in. His pain is 6/10. Any changes in Ambulatory Summary Sheet? None        Objective:      PROM  Flexion 154°  Abduction 125 °  ER 48°  IR 80 °      Exercises: (No more than 4 columns)   Exercise/Equipment Date 12/30/22 #1 1/9/23 #2 1/13/23 #3       Week 4    WARM UP         Manual  PROM all dir. PROM all dir. TABLE                                       STANDING                                                     PROPRIOCEPTION                                    MODALITIES                      Other Therapeutic Activities/Education:  Reviewed precautions for week 4. Home Exercise Program:    Access Code: [email or web address]  URL: [email or web address]  Date: 12/30/2022  Prepared by: Jacob Waters    Exercises  Seated Scapular Retraction - 1 x daily - 7 x weekly - 3 sets - 10 reps  Seated Elbow Flexion and Extension AROM - 1 x daily - 7 x weekly - 3 sets - 10 reps  Seated Shoulder Flexion Self PROM - 1 x daily - 7 x weekly - 3 sets - 10 reps  Seated Gripping Towel - 1 x daily - 7 x weekly - 3 sets - 10 reps      Manual Treatments:  PROM all direction within pain free range    Modalities:  Vaso 10' L shoulder in seated    Communication with other providers:    POC faxed 12/30/22    Assessment:  (Response towards treatment session) (Pain Rating)  Pt demonstrated good tolerance to tx with manual therapy with increased PROM. Will start AAROM at week 5 next visit. Pt would continue to benefit from skilled therapy interventions to address remaining impairments, improve mobility and strength and progress toward goal completion while reducing risk for re-injury or further decline. End pain = 2/10 pain     Assessment: Pt is a 81 y.o. male w/DX L shoulder pain. Pt reports having chronic L shoulder pain after breaking his \"collar bone\" in 2005. Then recently, slipped into a hole in the ground and caught himself w/his left hand. Since then, increased pain noticed and was identified that there was an partial RC tear/tendonitis. Now, pt is s/p L RC repair on 12/12/22. PLOF:  Prior to this surgery, was independent w/all adls w/o left shoulder pain, was able to lift a good amount of weight overhead, had a 50# limit on lifting d/t hx back surgery. 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 Patient will benefit from physical therapy to improve strength, ROM and function of LUE in a graded fashion w/adhearance to precautions and protocols.       Plan for Next Session:   Specific Instructions for Next Treatment: shoulder protocol in soft chart    Time In / Time Out:    1300/1340      Timed Code/Total Treatment Minutes:  30'/10'  2 MT 1 vaso    Next Progress Note due:  10 visits      Plan of Care Interventions:  [x] Therapeutic Exercise  [x] Modalities:  [x] Therapeutic Activity     [x] Ultrasound  [x] Estim  [] Gait Training      [] Cervical Traction [] Lumbar Traction  [x] Neuromuscular Re-education    [] Cold/hotpack [] Iontophoresis   [x] Instruction in HEP      [x] Vasopneumatic   [x] Dry Needling    [x] Manual Therapy               [] Aquatic Therapy              Electronically signed by:  Allie Cabrera JAYY NAIDUT 1/13/2023, 1:01 PM

## 2023-01-17 ENCOUNTER — HOSPITAL ENCOUNTER (OUTPATIENT)
Dept: PHYSICAL THERAPY | Age: 73
Setting detail: THERAPIES SERIES
Discharge: HOME | End: 2023-01-17
Payer: MEDICARE

## 2023-01-17 PROCEDURE — 97140 MANUAL THERAPY 1/> REGIONS: CPT

## 2023-01-17 PROCEDURE — 97016 VASOPNEUMATIC DEVICE THERAPY: CPT

## 2023-01-17 PROCEDURE — 97110 THERAPEUTIC EXERCISES: CPT

## 2023-01-17 NOTE — FLOWSHEET NOTE
Outpatient Physical Therapy  [place]           [x] Phone: [phone]   Fax: [phone]  [place]           [] Phone: [phone]   Fax: [phone]        Physical Therapy Daily Treatment Note  Date:  2023    Patient Name:  Carlos Kothari    :  1950  MRN: [id number]  Restrictions/Precautions: See soft chart     Diagnosis:   Pain in left shoulder [M25.512]    Date of Injury/Surgery: RC repair left shoulder on 22  Treatment Diagnosis:  L shoulder pain, impaired ROM/strength, impaired function  Insurance/Certification information:  [place] - 15 [place]  Referring Physician:  Juan M Pierce MD     PCP: Luiza Taylor MD  Next Doctor Visit:  Unknown  Plan of care signed (Y/N):  Pending  Outcome Measure: Quick Dash 84% disability  Visit# / total visits:   4/15  Pain level: 0/10    Goals:     Patient goals: Return full function/use of LUE. Short term goals  Time Frame for Short term goals: In 4 weeks, patient will  demonstrate compliance and independence w/HEP. adhere to all precautions for L shoulder RC repair to avoid re-injury of shoulder. Long Term Goals  Time Frame for Long Term Goals: In 8 weeks, patient will  demonstrate compliance and independence w/HEP and shoulder precautions. gain full PROM L shoulder for advancement toward functional use of shoulder for adls, daily activities. achieve full AROM by end of week 8 post-operative to allow advancement to intermediate Phase/Phase 3 for a gradual return to functional activities. begin rotator cuff isometrics for graded strengthening of healing tissue. Summary of Evaluation:  Assessment: Pt is a 81 y.o. male w/DX L shoulder pain. Pt reports having chronic L shoulder pain after breaking his \"collar bone\" in . Then recently, slipped into a hole in the ground and caught himself w/his left hand. Since then, increased pain noticed and was identified that there was an partial RC tear/tendonitis.   Now, pt is s/p L RC repair on 12/12/22. PLOF:  Prior to this surgery, was independent w/all adls w/o left shoulder pain, was able to lift a good amount of weight overhead, had a 50# limit on lifting d/t hx back surgery. 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 Patient will benefit from physical therapy to improve strength, ROM and function of LUE in a graded fashion w/adhearance to precautions and protocols. Subjective:  Pt stated his doesn't have any pain coming in but after his exercises his pain is 8/10 last night. He is using 5# DB for biceps curl. Any changes in Ambulatory Summary Sheet? None        Objective:      1/17/23   PROM  Flexion 154°  Abduction 125 °  ER 48°  IR 80 °      Exercises: (No more than 4 columns)   Exercise/Equipment Date 12/30/22 #1 1/9/23 #2 1/13/23 #3 1/17/23 #4       Week 4  Week 5   WARM UP          Manual  PROM all dir. PROM all dir. PROM all dir   Pulley    X 15 within pain free range   TABLE       Punches/press w cane    2x5   Flexion w cane    X10    Supine ER w cane    X 10   SL scap squeeze/ext manual     X10 5\" ea. STANDING       Abduction w cane    x10                                                  PROPRIOCEPTION                                          MODALITIES                         Other Therapeutic Activities/Education:  Reviewed precautions for week 4. Home Exercise Program:    Access Code: [email or web address]  URL: [email or web address]  Date: 12/30/2022  Prepared by: Jasiel Delacruz    Exercises  Seated Scapular Retraction - 1 x daily - 7 x weekly - 3 sets - 10 reps  Seated Elbow Flexion and Extension AROM - 1 x daily - 7 x weekly - 3 sets - 10 reps  Seated Shoulder Flexion Self PROM - 1 x daily - 7 x weekly - 3 sets - 10 reps  Seated Gripping Towel - 1 x daily - 7 x weekly - 3 sets - 10 reps    Recommended using lesser DB weight with biceps curl at home  Access Code: [facility]  URL: [email or web address]  Date: 01/17/2023  Prepared by: Nisha Kim    Exercises  Supine Shoulder Flexion Extension AAROM with Dowel - 1 x daily - 7 x weekly - 3 sets - 10 reps  Supine Shoulder External Rotation in 45 Degrees Abduction AAROM with Dowel - 1 x daily - 7 x weekly - 3 sets - 10 reps  Standing Shoulder Abduction AAROM with Dowel - 1 x daily - 7 x weekly - 3 sets - 10 reps  Supine Shoulder Press with Dowel - 1 x daily - 7 x weekly - 3 sets - 10 reps      Manual Treatments:  PROM all direction within pain free range    Modalities:     Patient received vasocompression on their L shoulder for pain and inflammation for 10 min on low pressure. Patient had negative skin reaction afterwards. ROM of the effected extremity pre [id number]=346°  flexion;  3/10 pain  Post-Vasocompression= same   See subjective and assessment for pre and post treatment patient reported pain levels. Communication with other providers:    POC faxed 12/30/22    Assessment:  (Response towards treatment session) (Pain Rating)  Pt demonstrated good tolerance to tx with progressed to [facility] exercises. Updated HEP. Pt would continue to benefit from skilled therapy interventions to address remaining impairments, improve mobility and strength and progress toward goal completion while reducing risk for re-injury or further decline. End pain = 1-2/10 pain     Assessment: Pt is a 81 y.o. male w/DX L shoulder pain. Pt reports having chronic L shoulder pain after breaking his \"collar bone\" in 2005. Then recently, slipped into a hole in the ground and caught himself w/his left hand. Since then, increased pain noticed and was identified that there was an partial RC tear/tendonitis. Now, pt is s/p L RC repair on 12/12/22.   PLOF:  Prior to this surgery, was independent w/all adls w/o left shoulder pain, was able to lift a good amount of weight overhead, had a 50# limit on lifting d/t hx back surgery. 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 Patient will benefit from physical therapy to improve strength, ROM and function of LUE in a graded fashion w/adhearance to precautions and protocols.       Plan for Next Session:   Specific Instructions for Next Treatment: shoulder protocol in soft chart    Time In / Time Out:    1303/1353      Timed Code/Total Treatment Minutes:  40'/50'  1 MT 2 TE 1 vaso    Next Progress Note due:  10 visits      Plan of Care Interventions:  [x] Therapeutic Exercise  [x] Modalities:  [x] Therapeutic Activity     [x] Ultrasound  [x] Estim  [] Gait Training      [] Cervical Traction [] Lumbar Traction  [x] Neuromuscular Re-education    [] Cold/hotpack [] Iontophoresis   [x] Instruction in HEP      [x] Vasopneumatic   [x] Dry Needling    [x] Manual Therapy               [] Aquatic Therapy              Electronically signed by:  Judit Huff PTA, CLT 1/17/2023, 11:35 AM

## 2023-01-20 ENCOUNTER — HOSPITAL ENCOUNTER (OUTPATIENT)
Dept: PHYSICAL THERAPY | Age: 73
Setting detail: THERAPIES SERIES
Discharge: HOME | End: 2023-01-20
Payer: MEDICARE

## 2023-01-20 PROCEDURE — 97112 NEUROMUSCULAR REEDUCATION: CPT

## 2023-01-20 PROCEDURE — 97110 THERAPEUTIC EXERCISES: CPT

## 2023-01-20 NOTE — FLOWSHEET NOTE
Outpatient Physical Therapy  [place]           [x] Phone: [phone]   Fax: [phone]  [place]           [] Phone: [phone]   Fax: [phone]        Physical Therapy Daily Treatment Note  Date:  2023    Patient Name:  Aurora Carbajal    :  1950  MRN: [id number]  Restrictions/Precautions: See soft chart     Diagnosis:   Pain in left shoulder [M25.512]    Date of Injury/Surgery: RC repair left shoulder on 22  Treatment Diagnosis:  L shoulder pain, impaired ROM/strength, impaired function  Insurance/Certification information:  [place] - 15 visits  Referring Physician:  Terra Leyva MD   , Surgeon Dr. Roman Adorno DO  PCP: Jennifer Purvis MD  Next Doctor Visit:  Unknown  Plan of care signed (Y/N):  Pending  Outcome Measure: Quick Dash 84% disability  Visit# / total visits:   5/15  Pain level: 1-2/10    Goals:     Patient goals: Return full function/use of LUE. Short term goals  Time Frame for Short term goals: In 4 weeks, patient will  demonstrate compliance and independence w/HEP. adhere to all precautions for L shoulder RC repair to avoid re-injury of shoulder. - MET 23           Long Term Goals  Time Frame for Long Term Goals: In 8 weeks, patient will  demonstrate compliance and independence w/HEP and shoulder precautions. - ONGOING 23  gain full PROM L shoulder for advancement toward functional use of shoulder for adls, daily activities. - IMPROVING/ONGOING 23  achieve full AROM by end of week 8 post-operative to allow advancement to intermediate Phase/Phase 3 for a gradual return to functional activities. begin rotator cuff isometrics for graded strengthening of healing tissue. Summary of Evaluation:  Assessment: Pt is a 81 y.o. male w/DX L shoulder pain. Pt reports having chronic L shoulder pain after breaking his \"collar bone\" in . Then recently, slipped into a hole in the ground and caught himself w/his left hand.   Since then, increased pain noticed and was identified that there was an partial RC tear/tendonitis. Now, pt is s/p L RC repair on 12/12/22. PLOF:  Prior to this surgery, was independent w/all adls w/o left shoulder pain, was able to lift a good amount of weight overhead, had a 50# limit on lifting d/t hx back surgery. 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 Patient will benefit from physical therapy to improve strength, ROM and function of LUE in a graded fashion w/adhearance to precautions and protocols. Subjective:    Pt had f/u w/surgeon on 1/18/23. Approved for pain-free AROM and lifting up to 10#. Sleeping well. Any changes in Ambulatory Summary Sheet? None        Objective:      Orders received from [facility], DO (surgeon)/written 1/18/23  PT eval and treat  2-3x/wk x 4-6 weeks (pt only approved for 15 visits from [place])  Shoulder rehab L  Nontraumatic incomplete tear of left rotator cuff 12/12/22. Pt was already referred to therapy by PCP and being seen for the above. Will include Dr. Henry Marte in future progress updates. Pt only allowed 15 visits per [place].    1/20/23:  PROM - supine  Flexion 162  Abd 170  ER 65  IR 68  All w/discomfort EOR    AROM - standing  Flexion 145 deg against wall  Abd 110 deg, vc/tc to avoid substitution   No pain      Exercises: (No more than 4 columns)   Exercise/Equipment Date 12/30/22 #1 1/9/23 #2 1/13/23 #3 1/17/23 #4 1/20/23  #5       Week 4  Week 5    WARM UP           Manual  PROM all dir. PROM all dir. PROM all dir PROM all dir   Pulley    X 15 within pain free range    TABLE        Punches/press w cane    2x5 1 x 10   Flexion w cane    X10  1 x 10   Supine ER w cane    X 10 1 x 10  Towel b/t arm and osvaldo   SL scap squeeze/ext manual     X10 5\" ea. X10 5\" ea.                       STANDING        Abduction w cane x10 X 10                                                        PROPRIOCEPTION                                                MODALITIES                            Other Therapeutic Activities/Education:      Home Exercise Program:    Access Code: [email or web address]  URL: [email or web address]/  Date: 12/30/2022  Prepared by: Breanne Mcnamara    Exercises  Seated Scapular Retraction - 1 x daily - 7 x weekly - 3 sets - 10 reps  Seated Elbow Flexion and Extension AROM - 1 x daily - 7 x weekly - 3 sets - 10 reps  Seated Shoulder Flexion Self PROM - 1 x daily - 7 x weekly - 3 sets - 10 reps  Seated Gripping Towel - 1 x daily - 7 x weekly - 3 sets - 10 reps    Recommended using lesser DB weight with biceps curl at home  Access Code: [facility]  URL: [email or web address]  Date: 01/17/2023  Prepared by: Lukasz Dominguez    Exercises  Supine Shoulder Flexion Extension AAROM with Dowel - 1 x daily - 7 x weekly - 3 sets - 10 reps  Supine Shoulder External Rotation in 45 Degrees Abduction AAROM with Dowel - 1 x daily - 7 x weekly - 3 sets - 10 reps  Standing Shoulder Abduction AAROM with Dowel - 1 x daily - 7 x weekly - 3 sets - 10 reps  Supine Shoulder Press with Dowel - 1 x daily - 7 x weekly - 3 sets - 10 reps      Manual Treatments:  PROM all direction within pain free range    Modalities:   Pt declined vaso    Communication:  POC faxed 12/30/22    Assessment:  (Response towards treatment session) (Pain Rating)    Pt demonstrated good tolerance for today's activities. Reviewed Protocol/POC. Pt in Phase II of shoulder rehab/allowed to lift up to 10# and perform AROM in pain-free ranges up to full range. Pt would continue to benefit from skilled therapy interventions to address remaining impairments, improve mobility and strength and progress toward goal completion while reducing risk for re-injury or further decline. End pain = 1-2/10 pain     Assessment: Pt is a 81 y.o. male w/DX L shoulder pain. Pt reports having chronic L shoulder pain after breaking his \"collar bone\" in 2005. Then recently, slipped into a hole in the ground and caught himself w/his left hand. Since then, increased pain noticed and was identified that there was an partial RC tear/tendonitis. Now, pt is s/p L RC repair on 12/12/22. PLOF:  Prior to this surgery, was independent w/all adls w/o left shoulder pain, was able to lift a good amount of weight overhead, had a 50# limit on lifting d/t hx back surgery. 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 Patient will benefit from physical therapy to improve strength, ROM and function of LUE in a graded fashion w/adhearance to precautions and protocols.       Plan for Next Session:   Specific Instructions for Next Treatment: shoulder protocol in soft chart    Time In / Time Out:    1125/1205      Timed Code/Total Treatment Minutes:  40'/40'  TE 25' (1), NRE 15' (1)    Next Progress Note due:  10 visits      Plan of Care Interventions:  [x] Therapeutic Exercise  [x] Modalities:  [x] Therapeutic Activity     [x] Ultrasound  [x] Estim  [] Gait Training      [] Cervical Traction [] Lumbar Traction  [x] Neuromuscular Re-education    [] Cold/hotpack [] Iontophoresis   [x] Instruction in HEP      [x] Vasopneumatic   [x] Dry Needling    [x] Manual Therapy               [] Aquatic Therapy              Electronically signed by:  Roble Powell, PT 1/20/2023, 11:30 AM

## 2023-01-24 ENCOUNTER — HOSPITAL ENCOUNTER (OUTPATIENT)
Dept: PHYSICAL THERAPY | Age: 73
Setting detail: THERAPIES SERIES
Discharge: HOME | End: 2023-01-24
Payer: MEDICARE

## 2023-01-24 PROCEDURE — 97140 MANUAL THERAPY 1/> REGIONS: CPT

## 2023-01-24 PROCEDURE — 97110 THERAPEUTIC EXERCISES: CPT

## 2023-01-24 PROCEDURE — 97112 NEUROMUSCULAR REEDUCATION: CPT

## 2023-01-24 PROCEDURE — 97016 VASOPNEUMATIC DEVICE THERAPY: CPT

## 2023-01-24 NOTE — FLOWSHEET NOTE
Outpatient Physical Therapy  [place]           [x] Phone: [phone]   Fax: [phone]  [place]           [] Phone: [phone]   Fax: [phone]        Physical Therapy Daily Treatment Note  Date:  2023    Patient Name:  Stefano Ratliff    :  1950  MRN: [id number]  Restrictions/Precautions: See soft chart     Diagnosis:   Pain in left shoulder [M25.512]    Date of Injury/Surgery: RC repair left shoulder on 22  Treatment Diagnosis:  L shoulder pain, impaired ROM/strength, impaired function  Insurance/Certification information:  [place] - 15 visits  Referring Physician:  Anabelle Sage MD   , Surgeon Dr. Ariel Oliver DO  PCP: Jayne Vance MD  Next Doctor Visit:  Unknown  Plan of care signed (Y/N):  Pending  Outcome Measure: Quick Dash 84% disability  Visit# / total visits:   6/15  Pain level: 0/10    Goals:     Patient goals: Return full function/use of LUE. Short term goals  Time Frame for Short term goals: In 4 weeks, patient will  demonstrate compliance and independence w/HEP. adhere to all precautions for L shoulder RC repair to avoid re-injury of shoulder. - MET 23    Long Term Goals  Time Frame for Long Term Goals: In 8 weeks, patient will  demonstrate compliance and independence w/HEP and shoulder precautions. - ONGOING 23  gain full PROM L shoulder for advancement toward functional use of shoulder for adls, daily activities. - IMPROVING/ONGOING 23  achieve full AROM by end of week 8 post-operative to allow advancement to intermediate Phase/Phase 3 for a gradual return to functional activities. begin rotator cuff isometrics for graded strengthening of healing tissue. Summary of Evaluation:  Assessment: Pt is a 81 y.o. male w/DX L shoulder pain. Pt reports having chronic L shoulder pain after breaking his \"collar bone\" in . Then recently, slipped into a hole in the ground and caught himself w/his left hand.   Since then, increased pain noticed and was identified that there was an partial RC tear/tendonitis. Now, pt is s/p L RC repair on 12/12/22. PLOF:  Prior to this surgery, was independent w/all adls w/o left shoulder pain, was able to lift a good amount of weight overhead, had a 50# limit on lifting d/t hx back surgery. 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 Patient will benefit from physical therapy to improve strength, ROM and function of LUE in a graded fashion w/adhearance to precautions and protocols. Subjective:  No pain. Having trouble lifting his arm today. Tired maybe      Any changes in Ambulatory Summary Sheet? None        Objective:      1/20/23:  PROM - supine  Flexion 162  Abd 170  ER 65  IR 68  All w/discomfort EOR    AROM - standing  Flexion 145 deg against wall  Abd 110 deg, vc/tc to avoid substitution   No pain      Exercises: (No more than 4 columns)   Exercise/Equipment 1/13/23 #3 1/17/23 #4 1/20/23  #5 1/24/23 #6       Week 5  Week 6   WARM UP          Manual PROM all dir. PROM all dir PROM all dir PROM all dir   Pulley  X 15 within pain free range  2'/1' flex/ scap   UBE    2'/2' 100>60 RPM end   TABLE       Punches/press w cane  2x5 1 x 10 X 10 AROM   Flexion w cane  X10  1 x 10 X20 cane   Supine ER w cane  X 10 1 x 10  Towel b/t arm and osvaldo 1 x 10  Towel b/t arm and bottom   SL scap squeeze/ext manual   X10 5\" ea. X10 5\" ea. ABC    X 1   SL ER    X 15   STANDING       Abduction w cane  x10 X 10 X 15   Towel flexion    X10    Mid Row     X20 GTB   extension    X20 GTB                             PROPRIOCEPTION       Ball on wall flexion/HA/ cw/ccw    X 10 ea.                                 MODALITIES       Vaso    10'              Other Therapeutic Activities/Education:      Home Exercise Program:    Access Code: [email or web address]  URL: [email or web address]  Date: 12/30/2022  Prepared by: Amadeo Lee    Exercises  Seated Scapular Retraction - 1 x daily - 7 x weekly - 3 sets - 10 reps  Seated Elbow Flexion and Extension AROM - 1 x daily - 7 x weekly - 3 sets - 10 reps  Seated Shoulder Flexion Self PROM - 1 x daily - 7 x weekly - 3 sets - 10 reps  Seated Gripping Towel - 1 x daily - 7 x weekly - 3 sets - 10 reps    Recommended using lesser DB weight with biceps curl at home  Access Code: [facility]  URL: [email or web address]/  Date: 01/17/2023  Prepared by: Shelbie Zabala    Exercises  Supine Shoulder Flexion Extension AAROM with Dowel - 1 x daily - 7 x weekly - 3 sets - 10 reps  Supine Shoulder External Rotation in 45 Degrees Abduction AAROM with Dowel - 1 x daily - 7 x weekly - 3 sets - 10 reps  Standing Shoulder Abduction AAROM with Dowel - 1 x daily - 7 x weekly - 3 sets - 10 reps  Supine Shoulder Press with Dowel - 1 x daily - 7 x weekly - 3 sets - 10 reps    Access Code: [email or web address]  URL: "[employer]/  Date: 01/24/2023  Prepared by: Shelbie Zabala    Exercises  Standing Shoulder Row with Anchored Resistance - 1 x daily - 7 x weekly - 3 sets - 10 reps  Shoulder extension with resistance - Neutral - 1 x daily - 7 x weekly - 3 sets - 10 reps  Sidelying Shoulder External Rotation - 1 x daily - 7 x weekly - 3 sets - 10 reps  Supine Single Arm Shoulder Protraction - 1 x daily - 7 x weekly - 3 sets - 10 reps  Shoulder Flexion Wall Slide with Towel - 1 x daily - 7 x weekly - 3 sets - 10 reps        Manual Treatments:  PROM all direction within pain free range    Modalities: Patient received vasocompression on their L shoulder to address post op pain and inflammation for 10 min on low pressure. Patient had negative skin reaction afterwards. Patients ROM was not assessed post vaso to avoid increasing pain and irritation in the shoulder joint. Pain was reported to have decreased from 2/10(fatigue) pre vaso, to a 0/10 post vaso. Communication:  POC faxed 12/30/22    Assessment:  (Response towards treatment session) (Pain Rating)  Pt demonstrated good tolerance to progressions. Updated HEP. No pain and moderate fatigue post tx. Flexion and ER strength deficits discomfort with eccentric control. Pt would continue to benefit from skilled therapy interventions to address remaining impairments, improve mobility and strength and progress toward goal completion while reducing risk for re-injury or further decline. End pain = 0/10 pain     Assessment: Pt is a 81 y.o. male w/DX L shoulder pain. Pt reports having chronic L shoulder pain after breaking his \"collar bone\" in 2005. Then recently, slipped into a hole in the ground and caught himself w/his left hand. Since then, increased pain noticed and was identified that there was an partial RC tear/tendonitis. Now, pt is s/p L RC repair on 12/12/22. PLOF:  Prior to this surgery, was independent w/all adls w/o left shoulder pain, was able to lift a good amount of weight overhead, had a 50# limit on lifting d/t hx back surgery. 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 Patient will benefit from physical therapy to improve strength, ROM and function of LUE in a graded fashion w/adhearance to precautions and protocols.       Plan for Next Session:   Specific Instructions for Next Treatment: shoulder protocol in soft chart    Time In / Time Out:   1300/1355      Timed Code/Total Treatment Minutes:  45'/55'  1TE 1 MT 1 NR 1 vaso    Next Progress Note due:  10 visits      Plan of Care Interventions:  [x] Therapeutic Exercise  [x] Modalities:  [x] Therapeutic Activity     [x] Ultrasound  [x] Estim  [] Gait Training      [] Cervical Traction [] Lumbar Traction  [x] Neuromuscular Re-education    [] Cold/hotpack [] Iontophoresis   [x] Instruction in HEP      [x] Vasopneumatic   [x] Dry Needling    [x] Manual Therapy               [] Aquatic Therapy              Electronically signed by:  ELYSIA Diaz 1/24/2023, 1:03 PM

## 2023-01-27 ENCOUNTER — HOSPITAL ENCOUNTER (OUTPATIENT)
Dept: PHYSICAL THERAPY | Age: 73
Setting detail: THERAPIES SERIES
Discharge: HOME | End: 2023-01-27
Payer: MEDICARE

## 2023-01-27 PROCEDURE — 97110 THERAPEUTIC EXERCISES: CPT

## 2023-01-27 PROCEDURE — 97140 MANUAL THERAPY 1/> REGIONS: CPT

## 2023-01-27 NOTE — FLOWSHEET NOTE
Outpatient Physical Therapy  [place]           [x] Phone: [phone]   Fax: [phone]  [place]           [] Phone: [phone]   Fax: [phone]        Physical Therapy Daily Treatment Note  Date:  2023    Patient Name:  Ambika Thapa    :  1950  MRN: [id number]  Restrictions/Precautions: See soft chart/  May begin to lift and work up to 10#. Diagnosis:   Pain in left shoulder [M25.512]    Date of Injury/Surgery: RC repair left shoulder on 22  Treatment Diagnosis:  L shoulder pain, impaired ROM/strength, impaired function  Ins[place]ification information:  [place]  Referring Physician:  Laren Pallas, MD   , Surgeon Dr. Teja Hong DO  PCP: Rod Pearl MD  Next Doctor Visit:  Recent visit, no current appointment to see surgeon (22sm)    Plan of care signed (Y/N):  Pending  Outcome Measure: Quick Dash 84% disability  Quick Dash 127/ = 47.7% disability    Visit# / total visits:   7/15  Pain level: 0/10 pain, 6/10 tightness     Goals:     Patient goals: Return full function/use of LUE. Short term goals  Time Frame for Short term goals: In 4 weeks, patient will  demonstrate compliance and independence w/HEP. - ONGOING/COMPLYING 23  adhere to all precautions for L shoulder RC repair to avoid re-injury of shoulder. - MET 23    Long Term Goals  Time Frame for Long Term Goals: In 8 weeks, patient will  demonstrate compliance and independence w/HEP and shoulder precautions. - ONGOING/COMPLYING 23  gain full PROM L shoulder for advancement toward functional use of shoulder for adls, daily activities. - IMPROVING/ONGOING 23  achieve full AROM by end of week 8 post-operative to allow advancement to intermediate Phase/Phase 3 for a gradual return to functional activities.  - IMPROVING/ONGOING 23  begin rotator cuff isometrics for graded strengthening with healing tissue. - INITIATED/ONGOING 23  score at <= 30% disability on Quick Dash as indication of improvement w/daily activities. - NEW GOAL 1/27/23         Summary of Evaluation:  Assessment: Pt is a 81 y.o. male w/DX L shoulder pain. Pt reports having chronic L shoulder pain after breaking his \"collar bone\" in 2005. Then recently, slipped into a hole in the ground and caught himself w/his left hand. Since then, increased pain noticed and was identified that there was an partial RC tear/tendonitis. Now, pt is s/p L RC repair on 12/12/22. PLOF:  Prior to this surgery, was independent w/all adls w/o left shoulder pain, was able to lift a good amount of weight overhead, had a 50# limit on lifting d/t hx back surgery. 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 Patient will benefit from physical therapy to improve strength, ROM and function of LUE in a graded fashion w/adhearance to precautions and protocols. Subjective:    Performing HEP per pt report. Doing more around the house/normalizing activities. Pt verbalizes is functioning w/o surgical protocols. Any changes in Ambulatory Summary Sheet? None        Objective:        PROM - supine  Flexion 150 (moderate muscle fasiculations posterior shoulder tricep/deltoid?)  Abd 170 (min muscle fasiculations)  ER 65  IR 65  All w/discomfort EOR (tightness and some pain)    AROM - standing  Scaption 125 deg w/min compensatory movement  Abduction 117 w/deltoid discomfort and min compensatory movement    L tricep fasciculation/twitching w/PROM > 90 deg    Fair+ scapular mobility    MMT:  Shoulder 3+ to 4-/5 within available ROM.       Exercises: (No more than 4 columns)   Exercise/Equipment 1/20/23  #5 1/24/23 #6 1/27/22 #7       Week 6 PROGRESS NOTE/GOALS ASSESSED   WARM UP         Manual PROM all dir PROM all dir PROM all dir   Pulley  2'/1' flex/ scap After mobes  X 20    UBE  2'/2' 100>60 RPM end 3' F/B   TABLE      Punches/press w cane 1 x 10 X 10 AROM    Flexion w cane 1 x 10 X20 cane    Supine ER w cane 1 x 10  Towel b/t arm and osvaldo 1 x 10  Towel b/t arm and bottom    SL scap squeeze/ext manual  X10 5\" ea. ABC  X 1    SL ER  X 15    STANDING      Abduction w cane X 10 X 15    Towel flexion  X10     Mid Row   X20 GTB    extension  X20 GTB                           PROPRIOCEPTION      Ball on wall flexion/HA/ cw/ccw  X 10 ea. X 20 ea. MODALITIES      Vaso  10' Declined d/t 0/10 oain             Other Therapeutic Activities/Education:      Home Exercise Program:    Access Code: [email or web address]  URL: [email or web address]  Date: 12/30/2022  Prepared by: Liliam Lopez    Exercises  Seated Scapular Retraction - 1 x daily - 7 x weekly - 3 sets - 10 reps  Seated Elbow Flexion and Extension AROM - 1 x daily - 7 x weekly - 3 sets - 10 reps  Seated Shoulder Flexion Self PROM - 1 x daily - 7 x weekly - 3 sets - 10 reps  Seated Gripping Towel - 1 x daily - 7 x weekly - 3 sets - 10 reps    Recommended using lesser DB weight with biceps curl at home  Access Code: [facility]  URL: [email or web address]  Date: 01/17/2023  Prepared by: Paralee Sires    Exercises  Supine Shoulder Flexion Extension AAROM with Dowel - 1 x daily - 7 x weekly - 3 sets - 10 reps  Supine Shoulder External Rotation in 45 Degrees Abduction AAROM with Dowel - 1 x daily - 7 x weekly - 3 sets - 10 reps  Standing Shoulder Abduction AAROM with Dowel - 1 x daily - 7 x weekly - 3 sets - 10 reps  Supine Shoulder Press with Dowel - 1 x daily - 7 x weekly - 3 sets - 10 reps    Access Code: [email or web address]  URL: [email or web address]  Date: 01/24/2023  Prepared by: Paralee Sires    Exercises  Standing Shoulder Row with Anchored Resistance - 1 x daily - 7 x weekly - 3 sets - 10 reps  Shoulder extension with resistance - Neutral - 1 x daily - 7 x weekly - 3 sets - 10 reps  Sidelying Shoulder External Rotation - 1 x daily - 7 x weekly - 3 sets - 10 reps  Supine Single Arm Shoulder Protraction - 1 x daily - 7 x weekly - 3 sets - 10 reps  Shoulder Flexion Wall Slide with Towel - 1 x daily - 7 x weekly - 3 sets - 10 reps        Manual Treatments:  PROM all direction within pain free range, side lying scap mobes. Modalities: Patient received vasocompression on their L shoulder to address post op pain and inflammation for 10 min on low pressure. Patient had negative skin reaction afterwards. Patients ROM was not assessed post vaso to avoid increasing pain and irritation in the shoulder joint. Pain was reported to have decreased from 2/10(fatigue) pre vaso, to a 0/10 post vaso. Communication:  POC faxed 12/30/22    Assessment:  (Response towards treatment session) (Pain Rating)    Patient has been seen x 7 physical therapy visits from 12/30/22 - 1/27/23. Diagnosis:   Pain in left shoulder [M25.512]    Date of Injury/Surgery: RC repair left shoulder on 12/12/22  Treatment Diagnosis:  L shoulder pain, impaired ROM/strength, impaired function  Quick Dash has improved from 84% disability to 47.7% disability. Overall shoulder strength ranges from 3+ to 4-/5 within available ROM. Pt w/F+ scapular mobility. There is muscular fasciculation when raising arm above 110 deg (PROM)  See above for A/PROM results. Pt has made good progress, however would continue to benefit from skilled therapy interventions to address remaining impairments, improve mobility and strength and progress toward goal completion while reducing risk for re-injury or further decline. Patient is currently only approved by the [place] for 15 total visits. Will request additional if deemed medically necessary at visit #15. End pain = 0/10 pain     Assessment: Pt is a 81 y.o. male w/DX L shoulder pain. Pt reports having chronic L shoulder pain after breaking his \"collar bone\" in 2005.   Then recently, slipped into a hole in the ground and caught himself w/his left hand. Since then, increased pain noticed and was identified that there was an partial RC tear/tendonitis. Now, pt is s/p L RC repair on 12/12/22. PLOF:  Prior to this surgery, was independent w/all adls w/o left shoulder pain, was able to lift a good amount of weight overhead, had a 50# limit on lifting d/t hx back surgery. 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 Patient will benefit from physical therapy to improve strength, ROM and function of LUE in a graded fashion w/adhearance to precautions and protocols.       Plan for Next Session:   Specific Instructions for Next Treatment: shoulder protocol in soft chart    Time In / Time Out:   1115/1200  Timed Code/Total Treatment Minutes:  45'/45'  TE 30' (2) Manual 15' (1)    Next Progress Note due:  10 visits      Plan of Care Interventions:  [x] Therapeutic Exercise  [x] Modalities:  [x] Therapeutic Activity     [x] Ultrasound  [x] Estim  [] Gait Training      [] Cervical Traction [] Lumbar Traction  [x] Neuromuscular Re-education    [] Cold/hotpack [] Iontophoresis   [x] Instruction in HEP      [x] Vasopneumatic   [x] Dry Needling    [x] Manual Therapy               [] Aquatic Therapy              Electronically signed by:  Anabelle Rosado, PT  1/27/2023, 11:22 AM

## 2023-01-29 NOTE — PROGRESS NOTES
Physical Therapy      Outpatient Physical Therapy           [place]           [] Phone: [phone]   Fax: [phone]  [place]           [] Phone: [phone]   Fax: [phone]      To: Anabelle Sage MD     From: Jennifer Flores PT  Patient: Stefano Ratliff                    : 1950  Diagnosis:  Pain in left shoulder [M25.512]        Treatment Diagnosis:     L shoulder pain, impaired ROM/strength, impaired function  Date: 2023  [x]  Progress Note                []  Discharge Note    Evaluation Date:  23  Total Visits to date:  7  Cancels/No-shows to date:  0    Subjective:    Performing HEP per pt report. Doing more around the house/normalizing activities. Pt verbalizes is functioning w/o surgical protocols. Plan of Care/Treatment to date:  [x] Therapeutic Exercise    [x] Modalities:  [x] Therapeutic Activity     [] Ultrasound  [x] Electrical Stimulation  [] Gait Training      [] Cervical Traction   [] Lumbar Traction  [x] Neuromuscular Re-education  [] Cold/hotpack [] Iontophoresis  [x] Instruction in HEP      Other:  [x] Manual Therapy       [x]  Vasopneumatic  [] Aquatic Therapy       []   Dry Needle Therapy                      Objective/Significant Findings At Last Visit/Comments:    PROM - supine  Flexion 150 (moderate muscle fasiculations posterior shoulder tricep/deltoid?)  Abd 170 (min muscle fasiculations)  ER 65  IR 65  All w/discomfort EOR (tightness and some pain)     AROM - standing  Scaption 125 deg w/min compensatory movement  Abduction 117 w/deltoid discomfort and min compensatory movement     L tricep fasciculation/twitching w/PROM > 90 deg     Fair+ scapular mobility     MMT:  Shoulder 3+ to 4-/5 within available ROM. Assessment:     Patient has been seen x 7 physical therapy visits from 22 - 23.   Diagnosis:   Pain in left shoulder [M25.512]    Date of Injury/Surgery: RC repair left shoulder on 22  Treatment Diagnosis:  L shoulder pain, impaired ROM/strength, impaired function  Quick Dash has improved from 84% disability to 47.7% disability. Overall shoulder strength ranges from 3+ to 4-/5 within available ROM. Pt w/F+ scapular mobility. There is muscular fasciculation when raising arm above 110 deg (PROM)  See above for A/PROM results. Pt has made good progress, however would continue to benefit from skilled therapy interventions to address remaining impairments, improve mobility and strength and progress toward goal completion while reducing risk for re-injury or further decline. Patient is currently only approved by the [place] for 15 total visits. Will request additional if deemed medically necessary at visit #15. Goal Status:  [] Achieved [x] Partially Achieved  [] Not Achieved   Patient goals: Return full function/use of LUE. Short term goals  Time Frame for Short term goals: In 4 weeks, patient will  demonstrate compliance and independence w/HEP. - ONGOING/COMPLYING 1/27/23  adhere to all precautions for L shoulder RC repair to avoid re-injury of shoulder. - MET 1/20/23     Long Term Goals  Time Frame for Long Term Goals: In 8 weeks, patient will  demonstrate compliance and independence w/HEP and shoulder precautions. - ONGOING/COMPLYING 1/20/23  gain full PROM L shoulder for advancement toward functional use of shoulder for adls, daily activities. - IMPROVING/ONGOING 1/20/23  achieve full AROM by end of week 8 post-operative to allow advancement to intermediate Phase/Phase 3 for a gradual return to functional activities.  - IMPROVING/ONGOING 1/27/23  begin rotator cuff isometrics for graded strengthening with healing tissue. - INITIATED/ONGOING 1/27/23  score at <= 30% disability on Quick Dash as indication of improvement w/daily activities. - NEW GOAL 1/27/23           Frequency/Duration:  # Days per week: [] 1 day # Weeks: [] 1 week [x] 4 weeks [] 8 weeks     [x] 2 days   [] 2 weeks [] 5 weeks [] 10 weeks     [] 3 days   [] 3 weeks [] 6 weeks [] 12 weeks       Rehab Potential: [] Excellent [] Good [] Fair  [] Poor         Patient Status: [x] Continue per initial plan of Care     [] Patient now discharged     [] Additional visits requested, Please re-certify for additional visits:      Requested frequency/duration:  /week for weeks    If we are requesting more visits, we fully anticipate the patient's condition is expected to improve within the treatment timeframe we are requesting. Electronically signed by:  Russell Abdul PT 1/29/2023, 2:23 PM    If you have any questions or concerns, please don't hesitate to call.   Thank you for your referral.    Physician Signature:______________________ Date:______ Time: ________  By signing above, therapists plan is approved by physician

## 2023-01-31 ENCOUNTER — HOSPITAL ENCOUNTER (OUTPATIENT)
Dept: PHYSICAL THERAPY | Age: 73
Setting detail: THERAPIES SERIES
Discharge: HOME | End: 2023-01-31
Payer: OTHER GOVERNMENT

## 2023-01-31 PROCEDURE — 97110 THERAPEUTIC EXERCISES: CPT

## 2023-01-31 PROCEDURE — 97112 NEUROMUSCULAR REEDUCATION: CPT

## 2023-01-31 NOTE — FLOWSHEET NOTE
Outpatient Physical Therapy  [place]           [x] Phone: [phone]   Fax: [phone]  [place]           [] Phone: [phone]   Fax: [phone]        Physical Therapy Daily Treatment Note  Date:  2023    Patient Name:  Allie Paris    :  1950  MRN: [id number]  Restrictions/Precautions: See soft chart/  May begin to lift and work up to 10#. Diagnosis:   Pain in left shoulder [M25.512]    Date of Injury/Surgery: RC repair left shoulder on 22  Treatment Diagnosis:  L shoulder pain, impaired ROM/strength, impaired function  Insurance/Certification information:  [place] - 15 visits  Referring Physician:  Shantell Tsang MD   , Surgeon Dr. Brayan Alejo DO  PCP: Craig Cook MD  Next Doctor Visit:  Recent visit, no current appointment to see surgeon (22sm)    Plan of care signed (Y/N):  Pending  Outcome Measure: Quick Dash 84% disability  Quick Dash 23 = 47.7% disability    Visit# / total visits:   8/15  Pain level: 0/10 pain, 2/10 tightness w/raising LUE overhead in scapular plane    Goals:     Patient goals: Return full function/use of LUE. Short term goals  Time Frame for Short term goals: In 4 weeks, patient will  demonstrate compliance and independence w/HEP. - ONGOING/COMPLYING 23  adhere to all precautions for L shoulder RC repair to avoid re-injury of shoulder. - MET 23    Long Term Goals  Time Frame for Long Term Goals: In 8 weeks, patient will  demonstrate compliance and independence w/HEP and shoulder precautions. - ONGOING/COMPLYING 23  gain full PROM L shoulder for advancement toward functional use of shoulder for adls, daily activities. - IMPROVING/ONGOING 23  achieve full AROM by end of week 8 post-operative to allow advancement to intermediate Phase/Phase 3 for a gradual return to functional activities.  - IMPROVING/ONGOING 23  begin rotator cuff isometrics for graded strengthening with healing tissue. - INITIATED/ONGOING 1/27/23  score at <= 30% disability on Quick Dash as indication of improvement w/daily activities. - NEW GOAL 1/27/23         Summary of Evaluation:  Assessment: Pt is a 81 y.o. male w/DX L shoulder pain. Pt reports having chronic L shoulder pain after breaking his \"collar bone\" in 2005. Then recently, slipped into a hole in the ground and caught himself w/his left hand. Since then, increased pain noticed and was identified that there was an partial RC tear/tendonitis. Now, pt is s/p L RC repair on 12/12/22. PLOF:  Prior to this surgery, was independent w/all adls w/o left shoulder pain, was able to lift a good amount of weight overhead, had a 50# limit on lifting d/t hx back surgery. 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 Patient will benefit from physical therapy to improve strength, ROM and function of LUE in a graded fashion w/adhearance to precautions and protocols. Subjective:    Performing HEP per pt report. Doing more around the house/normalizing activities. Pt verbalizes is functioning w/o surgical protocols. Little to no pain. Any changes in Ambulatory Summary Sheet?   None        Objective:        PROM/AAROM - pulleys  Flexion in plane of scaption 150 minimal fasciculation    AROM - standing  Scaption 125 deg w/min compensatory movement  Abduction 117 w/deltoid discomfort and min compensatory movement    L tricep fasciculation/twitching w/PROM > 110 deg (min)          Exercises: (No more than 4 columns)   Exercise/Equipment 1/20/23  #5 1/24/23 #6 1/27/23 #7 1/31/23 #8       Week 6 PROGRESS NOTE/GOALS ASSESSED    WARM UP          Manual PROM all dir PROM all dir PROM all dir    Pulley  2'/1' flex/ scap After mobes  X 20  X 20   Scapular plane   UBE  2'/2' 100>60 RPM end 3' F/B 3'F/B   TABLE       Punches/press w cane 1 x 10 X 10 AROM  2 x 10  1# cane  Vc/demo   Flexion w cane 1 x 10 X20 cane  1 x 10  Cane  90 deg  Sitting  Posture correction. Increased discomform  Lateral deltoid temporary Vc/tc/demo   Supine ER w cane 1 x 10  Towel b/t arm and osvaldo 1 x 10  Towel b/t arm and bottom     SL scap squeeze/ext manual  X10 5\" ea. ABC  X 1     prone strengthening as per protocol    Next visit   SL ER  X 15     STANDING              Abduction w cane X 10 X 15  X 15  1# cane   Towel flexion  X10      Mid Row   X20 GTB  X 20 GTB   extension  X20 GTB  X 20 GTB                             PROPRIOCEPTION       Ball on wall flexion/HA/ cw/ccw  X 10 ea. X 20 ea. X 20 ea   Roller up wall    1 x 8  Increased discomfort  Postural substitution  Vc/tc   Functional caudal glide/limiting substitution w/OH activities    During all OH activities and TB exercises  Vc/tc/demo   Ball press standing    2 x 10  Med beach ball on table  Vc/tc/demo   Tband caudal glide    1 x 10  YTB  Vc/tc/demo   MODALITIES       Vaso  10' Declined d/t 0/10 oain Declined/no pain              Other Therapeutic Activities/Education:      Home Exercise Program:    Access Code: [email or web address]  URL: [email or web address]  Date: 12/30/2022  Prepared by: Abdirashid Ngo    Exercises  Seated Scapular Retraction - 1 x daily - 7 x weekly - 3 sets - 10 reps  Seated Elbow Flexion and Extension AROM - 1 x daily - 7 x weekly - 3 sets - 10 reps  Seated Shoulder Flexion Self PROM - 1 x daily - 7 x weekly - 3 sets - 10 reps  Seated Gripping Towel - 1 x daily - 7 x weekly - 3 sets - 10 reps    Recommended using lesser DB weight with biceps curl at home  Access Code: [facility]  URL: [email or web address]/  Date: 01/17/2023  Prepared by: Aiden Funes    Exercises  Supine Shoulder Flexion Extension AAROM with Dowel - 1 x daily - 7 x weekly - 3 sets - 10 reps  Supine Shoulder External Rotation in 45 Degrees Abduction AAROM with Dowel - 1 x daily - 7 x weekly - 3 sets - 10 reps  Standing Shoulder Abduction AAROM with Dowel - 1 x daily - 7 x weekly - 3 sets - 10 reps  Supine Shoulder Press with Dowel - 1 x daily - 7 x weekly - 3 sets - 10 reps    Access Code: [email or web address]  URL: [email or web address]  Date: 01/24/2023  Prepared by: Ariel Zazueta    Exercises  Standing Shoulder Row with Anchored Resistance - 1 x daily - 7 x weekly - 3 sets - 10 reps  Shoulder extension with resistance - Neutral - 1 x daily - 7 x weekly - 3 sets - 10 reps  Sidelying Shoulder External Rotation - 1 x daily - 7 x weekly - 3 sets - 10 reps  Supine Single Arm Shoulder Protraction - 1 x daily - 7 x weekly - 3 sets - 10 reps  Shoulder Flexion Wall Slide with Towel - 1 x daily - 7 x weekly - 3 sets - 10 reps    1/31/23:  shoulder downward press w/YTB for caudal glide and neuro re-ed. Manual Treatments:  PROM all direction within pain free range, side lying scap mobes. Modalities: Patient received vasocompression on their L shoulder to address post op pain and inflammation for 10 min on low pressure. Patient had negative skin reaction afterwards. Patients ROM was not assessed post vaso to avoid increasing pain and irritation in the shoulder joint. Pain was reported to have decreased from 2/10(fatigue) pre vaso, to a 0/10 post vaso. Communication:  POC faxed 12/30/22, PN 1/27/23    Assessment:  (Response towards treatment session) (Pain Rating)    Pt tolerated tx very well. Continues to demonstrate shoulder/scap substitution w/OH activity and TB exercises. Improved w/vc/tc/demo and practice. AROM above 110 deg can provoke impingement type symptoms. PROM w/purnima to 150 deg no s/s impingement and minimal muscle fasciculations. End pain = 0/10 pain   Pt will benefit from continued PT services to restore full use, strength, ROM L shoulder post-surgery. Assessment: Pt is a 81 y.o. male w/DX L shoulder pain. Pt reports having chronic L shoulder pain after breaking his \"collar bone\" in 2005.   Then recently, slipped into a hole in the ground and caught himself w/his left hand. Since then, increased pain noticed and was identified that there was an partial RC tear/tendonitis. Now, pt is s/p L RC repair on 12/12/22. PLOF:  Prior to this surgery, was independent w/all adls w/o left shoulder pain, was able to lift a good amount of weight overhead, had a 50# limit on lifting d/t hx back surgery. 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 Patient will benefit from physical therapy to improve strength, ROM and function of LUE in a graded fashion w/adhearance to precautions and protocols.       Plan for Next Session:   Specific Instructions for Next Treatment: shoulder protocol in soft chart    Time In / Time Out:   1150/1238    Timed Code/Total Treatment Minutes:  48'/48'  TE 18' (1), NRE 30' (2)    Next Progress Note due:  10 visits      Plan of Care Interventions:  [x] Therapeutic Exercise  [x] Modalities:  [x] Therapeutic Activity     [x] Ultrasound  [x] Estim  [] Gait Training      [] Cervical Traction [] Lumbar Traction  [x] Neuromuscular Re-education    [] Cold/hotpack [] Iontophoresis   [x] Instruction in HEP      [x] Vasopneumatic   [x] Dry Needling    [x] Manual Therapy               [] Aquatic Therapy              Electronically signed by:  Robin Kauffman, PT  1/31/2023, 11:50 AM

## 2023-02-03 ENCOUNTER — HOSPITAL ENCOUNTER (OUTPATIENT)
Dept: PHYSICAL THERAPY | Age: 73
Setting detail: THERAPIES SERIES
Discharge: HOME | End: 2023-02-03
Payer: OTHER GOVERNMENT

## 2023-02-03 PROCEDURE — 97110 THERAPEUTIC EXERCISES: CPT

## 2023-02-03 PROCEDURE — 97140 MANUAL THERAPY 1/> REGIONS: CPT

## 2023-02-03 PROCEDURE — 97112 NEUROMUSCULAR REEDUCATION: CPT

## 2023-02-03 NOTE — FLOWSHEET NOTE
Outpatient Physical Therapy  [place]           [x] Phone: [phone]   Fax: [phone]  [place]           [] Phone: [phone]   Fax: [phone]        Physical Therapy Daily Treatment Note  Date:  2/3/2023    Patient Name:  Darryl Scott    :  1950  MRN: [id number]  Restrictions/Precautions: See soft chart/  May begin to lift and work up to 10#. Diagnosis:   Pain in left shoulder [M25.512]    Date of Injury/Surgery: RC repair left shoulder on 22  Treatment Diagnosis:  L shoulder pain, impaired ROM/strength, impaired function  Insurance/Certification information:  [place] - 15 [place]  Referring Physician:  Kristel Suh MD   , Surgeon Dr. Andreina Galvan DO  PCP: Roro Sainz MD  Next Doctor Visit:  Recent visit, no current appointment to see surgeon (22sm)    Plan of care signed (Y/N):  Pending  Outcome Measure: Quick Dash 84% disability  Quick Dash 23 = 47.7% disability    Visit# / total visits:   9/15  Pain level: 0/10 pain, 7/10 tightness w/raising LUE overhead in scapular plane    Goals:     Patient goals: Return full function/use of LUE. Short term goals  Time Frame for Short term goals: In 4 weeks, patient will  demonstrate compliance and independence w/HEP. - ONGOING/COMPLYING 23  adhere to all precautions for L shoulder RC repair to avoid re-injury of shoulder. - MET 23    Long Term Goals  Time Frame for Long Term Goals: In 8 weeks, patient will  demonstrate compliance and independence w/HEP and shoulder precautions. - ONGOING/COMPLYING 23  gain full PROM L shoulder for advancement toward functional use of shoulder for adls, daily activities. - IMPROVING/ONGOING 23  achieve full AROM by end of week 8 post-operative to allow advancement to intermediate Phase/Phase 3 for a gradual return to functional activities.  - IMPROVING/ONGOING 23  begin rotator cuff isometrics for graded strengthening with healing tissue. - INITIATED/ONGOING 23  score at <= 30% disability on Quick Dash as indication of improvement w/daily activities. - NEW GOAL 1/27/23         Summary of Evaluation:  Assessment: Pt is a 81 y.o. male w/DX L shoulder pain. Pt reports having chronic L shoulder pain after breaking his \"collar bone\" in 2005. Then recently, slipped into a hole in the ground and caught himself w/his left hand. Since then, increased pain noticed and was identified that there was an partial RC tear/tendonitis. Now, pt is s/p L RC repair on 12/12/22. PLOF:  Prior to this surgery, was independent w/all adls w/o left shoulder pain, was able to lift a good amount of weight overhead, had a 50# limit on lifting d/t hx back surgery. 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 Patient will benefit from physical therapy to improve strength, ROM and function of LUE in a graded fashion w/adhearance to precautions and protocols. Subjective:  Pt stated no pain at rest. He uses his arm as it lets him. It's really stiff. Any changes in Ambulatory Summary Sheet?   None        Objective:        PROM/AAROM - pulleys  Flexion in plane of scaption 150° minimal fasciculation    AROM - standing  Scaption 135° w/min compensatory movement  Abduction 113° w/deltoid discomfort and min compensatory movement        Exercises: (No more than 4 columns)   Exercise/Equipment 1/24/23 #6 1/27/23 #7 1/31/23 #8 2/3/23 #9      Week 6 PROGRESS NOTE/GOALS ASSESSED  Week 7.5   WARM UP          Manual PROM all dir PROM all dir     Pulley 2'/1' flex/ scap After mobes  X 20  X 20   Scapular plane X 20   Scapular plane   UBE 2'/2' 100>60 RPM end 3' F/B 3'F/B 2'/2' f/b   TABLE       Punches/press w cane X 10 AROM  2 x 10  1# cane  Vc/demo 2 x 10  1# cane  Vc/demo   Flexion w cane X20 cane  1 x 10  Cane  90 deg  Sitting  Posture correction. Increased discomform  Lateral deltoid temporary Vc/tc/demo   1 X10 supine   1 x 10  Cane  90 deg  Sitting  Posture correction. Increased discomform  Lateral deltoid temporary Vc/tc/demo   Supine ER w cane 1 x 10  Towel b/t arm and bottom      SL scap squeeze/ext manual        ABC X 1      prone row/ext   Next visit X15 2#   SL ER X 15      STANDING              Abduction w cane X 15  X 15  1# cane X 20  1# cane   Towel flexion X10       Mid Row  X20 GTB  X 20 GTB    extension X20 GTB  X 20 GTB                              PROPRIOCEPTION       Ball on wall flexion/HA/ cw/ccw X 10 ea. X 20 ea. X 20 ea X20 ea. Roller up wall   1 x 8  Increased discomfort  Postural substitution  Vc/tc 1 x 10  Increased discomfort  Postural substitution  Vc/tc   Functional caudal glide/limiting substitution w/OH activities   During all OH activities and TB exercises  Vc/tc/demo    Ball press standing   2 x 10  Med beach ball on table  Vc/tc/demo 2 x 10  Med beach ball on table  Vc/tc/demo   Tband caudal glide   1 x 10  YTB  Vc/tc/demo 2x15    MODALITIES       Vaso 10' Declined d/t 0/10 oain Declined/no pain declined              Other Therapeutic Activities/Education:      Home Exercise Program:    Access Code: [email or web address]  URL: [employer]/  Date: 12/30/2022  Prepared by: Denny Masker    Exercises  Seated Scapular Retraction - 1 x daily - 7 x weekly - 3 sets - 10 reps  Seated Elbow Flexion and Extension AROM - 1 x daily - 7 x weekly - 3 sets - 10 reps  Seated Shoulder Flexion Self PROM - 1 x daily - 7 x weekly - 3 sets - 10 reps  Seated Gripping Towel - 1 x daily - 7 x weekly - 3 sets - 10 reps    Recommended using lesser DB weight with biceps curl at home  Access Code: [facility]  URL: [employer]/  Date: 01/17/2023  Prepared by: Mirella Malling    Exercises  Supine Shoulder Flexion Extension AAROM with Dowel - 1 x daily - 7 x weekly - 3 sets - 10 reps  Supine Shoulder External Rotation in 45 Degrees Abduction AAROM with Dowel - 1 x daily - 7 x weekly - 3 sets - 10 reps  Standing Shoulder Abduction AAROM with Dowel - 1 x daily - 7 x weekly - 3 sets - 10 reps  Supine Shoulder Press with Dowel - 1 x daily - 7 x weekly - 3 sets - 10 reps    Access Code: [email or web address]  URL: [email or web address]/  Date: 01/24/2023  Prepared by: Santos Montoya    Exercises  Standing Shoulder Row with Anchored Resistance - 1 x daily - 7 x weekly - 3 sets - 10 reps  Shoulder extension with resistance - Neutral - 1 x daily - 7 x weekly - 3 sets - 10 reps  Sidelying Shoulder External Rotation - 1 x daily - 7 x weekly - 3 sets - 10 reps  Supine Single Arm Shoulder Protraction - 1 x daily - 7 x weekly - 3 sets - 10 reps  Shoulder Flexion Wall Slide with Towel - 1 x daily - 7 x weekly - 3 sets - 10 reps    1/31/23:  shoulder downward press w/YTB for caudal glide and neuro re-ed. Access Code: [email or web address]  URL: [email or web address]  Date: 02/03/2023  Prepared by: Santos VictorSellbrite    Exercises  Prone Shoulder Horizontal Abduction - 1 x daily - 7 x weekly - 3 sets - 10 reps  Prone Shoulder Extension - Single Arm - 1 x daily - 7 x weekly - 3 sets - 10 reps  Prone Shoulder Row - 1 x daily - 7 x weekly - 3 sets - 10 reps  Prone Shoulder Extension - Single Arm with Dumbbell - 1 x daily - 7 x weekly - 3 sets - 10 reps        Manual Treatments:  PROM all direction within pain free range, side lying scap mobes. Modalities: declined vaso     Communication:  POC faxed 12/30/22, PN 1/27/23    Assessment:  (Response towards treatment session) (Pain Rating)  Pt demonstrated good tolerance to treatment with added exercises. Updated HEP. Noted shoulder/scap substitution w/OH activity and TB exercises. Pt will benefit from continued PT services to restore full use, strength, ROM L shoulder post-surgery. End pain = 0/10 pain       Assessment: Pt is a 81 y.o. male w/DX L shoulder pain. Pt reports having chronic L shoulder pain after breaking his \"collar bone\" in 2005. Then recently, slipped into a hole in the ground and caught himself w/his left hand. Since then, increased pain noticed and was identified that there was an partial RC tear/tendonitis. Now, pt is s/p L RC repair on 12/12/22. PLOF:  Prior to this surgery, was independent w/all adls w/o left shoulder pain, was able to lift a good amount of weight overhead, had a 50# limit on lifting d/t hx back surgery. 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 Patient will benefit from physical therapy to improve strength, ROM and function of LUE in a graded fashion w/adhearance to precautions and protocols.       Plan for Next Session:   Specific Instructions for Next Treatment: shoulder protocol in soft chart    Time In / Time Out:  1300/1344    Timed Code/Total Treatment Minutes: 44'/44'  1 TE 1MT 1 NR    Next Progress Note due:  10 visits      Plan of Care Interventions:  [x] Therapeutic Exercise  [x] Modalities:  [x] Therapeutic Activity     [x] Ultrasound  [x] Estim  [] Gait Training      [] Cervical Traction [] Lumbar Traction  [x] Neuromuscular Re-education    [] Cold/hotpack [] Iontophoresis   [x] Instruction in HEP      [x] Vasopneumatic   [x] Dry Needling    [x] Manual Therapy               [] Aquatic Therapy              Electronically signed by:  Allie Cabrera PTA CLT 2/3/2023, 12:56 PM

## 2023-02-07 ENCOUNTER — HOSPITAL ENCOUNTER (OUTPATIENT)
Dept: PHYSICAL THERAPY | Age: 73
Setting detail: THERAPIES SERIES
Discharge: HOME | End: 2023-02-07
Payer: OTHER GOVERNMENT

## 2023-02-07 PROCEDURE — 97140 MANUAL THERAPY 1/> REGIONS: CPT

## 2023-02-07 PROCEDURE — 97110 THERAPEUTIC EXERCISES: CPT

## 2023-02-07 PROCEDURE — 97112 NEUROMUSCULAR REEDUCATION: CPT

## 2023-02-07 NOTE — FLOWSHEET NOTE
Outpatient Physical Therapy  [place]           [x] Phone: [phone]   Fax: [phone]  [place]           [] Phone: [phone]   Fax: [phone]        Physical Therapy Daily Treatment Note  Date:  2023    Patient Name:  Carlo Dumont    :  1950  MRN: [id number]  Restrictions/Precautions: See soft chart/  May begin to lift and work up to 10#. Diagnosis:   Pain in left shoulder [M25.512]    Date of Injury/Surgery: RC repair left shoulder on 22  Treatment Diagnosis:  L shoulder pain, impaired ROM/strength, impaired function  Insurance/Certification information:  [place] - 15 visits  Referring Physician:  Dionicio Manning MD   , Surgeon Dr. Marin Alvarado DO  PCP: Roxana Wallace MD  Next Doctor Visit:  Recent visit, no current appointment to see surgeon (22sm)    Plan of care signed (Y/N):  Pending  Outcome Measure: Quick Dash 84% disability  Quick Dash 23 = 47.7% disability    Visit# / total visits:   10/15  Pain level: 0/10 pain, 7/10 tightness w/raising LUE overhead in scapular plane    Goals:     Patient goals: Return full function/use of LUE. Short term goals  Time Frame for Short term goals: In 4 weeks, patient will  demonstrate compliance and independence w/HEP. - ONGOING/COMPLYING 23  adhere to all precautions for L shoulder RC repair to avoid re-injury of shoulder. - MET 23    Long Term Goals  Time Frame for Long Term Goals: In 8 weeks, patient will  demonstrate compliance and independence w/HEP and shoulder precautions. - ONGOING/COMPLYING 23  gain full PROM L shoulder for advancement toward functional use of shoulder for adls, daily activities. - IMPROVING/ONGOING 23  achieve full AROM by end of week 8 post-operative to allow advancement to intermediate Phase/Phase 3 for a gradual return to functional activities.  - IMPROVING/ONGOING 23  begin rotator cuff isometrics for graded strengthening with healing tissue. - INITIATED/ONGOING 1/27/23  score at <= 30% disability on Quick Dash as indication of improvement w/daily activities. - NEW GOAL 1/27/23         Summary of Evaluation:  Assessment: Pt is a 81 y.o. male w/DX L shoulder pain. Pt reports having chronic L shoulder pain after breaking his \"collar bone\" in 2005. Then recently, slipped into a hole in the ground and caught himself w/his left hand. Since then, increased pain noticed and was identified that there was an partial RC tear/tendonitis. Now, pt is s/p L RC repair on 12/12/22. PLOF:  Prior to this surgery, was independent w/all adls w/o left shoulder pain, was able to lift a good amount of weight overhead, had a 50# limit on lifting d/t hx back surgery. 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 Patient will benefit from physical therapy to improve strength, ROM and function of LUE in a graded fashion w/adhearance to precautions and protocols. Subjective:  Pt stated he did some shoveling poop. He was a little but fine now. 0/10 pain    Any changes in Ambulatory Summary Sheet? None    Objective:        Supine Flexion in plane of scaption 157°    AROM - standing  Scaption 140° w/min compensatory movement  Abduction 120° w/deltoid discomfort and min compensatory movement        Exercises: (No more than 4 columns)   Exercise/Equipment 1/27/23 #7 1/31/23 #8 2/3/23 #9 2/7/23 #10      PROGRESS NOTE/GOALS ASSESSED  Week 7.5    WARM UP          Manual PROM all dir      Pulley After mobes  X 20  X 20   Scapular plane X 20   Scapular plane X20 scapular plane   UBE 3' F/B 3'F/B 2'/2' f/b 2'/2' f/b   TABLE       Punches/press w cane  2 x 10  1# cane  Vc/demo 2 x 10  1# cane  Vc/demo 2 x 15  1#    Flexion w cane  1 x 10  Cane  90 deg  Sitting  Posture correction.   Increased discomform  Lateral deltoid temporary Vc/tc/demo 1 X10 supine   1 x 10  Cane  90 deg  Sitting  Posture correction. Increased discomform  Lateral deltoid temporary Vc/tc/demo X 20 1#     SL abd    X 20   SL ER    X 20   ABC    X1 1#   prone row/ext  Next visit X15 2# X20 2# ea. SL ER       STANDING       Wall push ups    X 20   Abduction w cane  X 15  1# cane X 20  1# cane X 20  1# cane AAROM   Isometric walkout    Dble YTB x 10 ea. Mid Row   X 20 GTB  X30 GTB   extension  X 20 GTB  X30 GTB   flexion                          PROPRIOCEPTION       Ball on wall flexion/HA/ cw/ccw X 20 ea. X 20 ea X20 ea. X30 ea. Roller up wall  1 x 8  Increased discomfort  Postural substitution  Vc/tc 1 x 10  Increased discomfort  Postural substitution  Vc/tc X10 flex  X10 scap   Functional caudal glide/limiting substitution w/OH activities  During all OH activities and TB exercises  Vc/tc/demo     Ball press standing  2 x 10  Med beach ball on table  Vc/tc/demo 2 x 10  Med beach ball on table  Vc/tc/demo    Tband caudal glide  1 x 10  YTB  Vc/tc/demo 2x15     MODALITIES       Vaso Declined d/t 0/10 oain Declined/no pain declined               Other Therapeutic Activities/Education:      Home Exercise Program:    Access Code: [email or web address]  URL: [email or web address]  Date: 12/30/2022  Prepared by: Mar Nuñez    Exercises  Seated Scapular Retraction - 1 x daily - 7 x weekly - 3 sets - 10 reps  Seated Elbow Flexion and Extension AROM - 1 x daily - 7 x weekly - 3 sets - 10 reps  Seated Shoulder Flexion Self PROM - 1 x daily - 7 x weekly - 3 sets - 10 reps  Seated Gripping Towel - 1 x daily - 7 x weekly - 3 sets - 10 reps    Recommended using lesser DB weight with biceps curl at home  Access Code: [facility]  URL: [email or web address]/  Date: 01/17/2023  Prepared by: Clement Guillory    Exercises  Supine Shoulder Flexion Extension AAROM with Dowel - 1 x daily - 7 x weekly - 3 sets - 10 reps  Supine Shoulder External Rotation in 45 Degrees Abduction AAROM with Dowel - 1 x daily - 7 x weekly - 3 sets - 10 reps  Standing Shoulder Abduction AAROM with Dowel - 1 x daily - 7 x weekly - 3 sets - 10 reps  Supine Shoulder Press with Dowel - 1 x daily - 7 x weekly - 3 sets - 10 reps    Access Code: [email or web address]  URL: [email or web address]  Date: 01/24/2023  Prepared by: Staci Pinch    Exercises  Standing Shoulder Row with Anchored Resistance - 1 x daily - 7 x weekly - 3 sets - 10 reps  Shoulder extension with resistance - Neutral - 1 x daily - 7 x weekly - 3 sets - 10 reps  Sidelying Shoulder External Rotation - 1 x daily - 7 x weekly - 3 sets - 10 reps  Supine Single Arm Shoulder Protraction - 1 x daily - 7 x weekly - 3 sets - 10 reps  Shoulder Flexion Wall Slide with Towel - 1 x daily - 7 x weekly - 3 sets - 10 reps    1/31/23:  shoulder downward press w/YTB for caudal glide and neuro re-ed. Access Code: [email or web address]  URL: [email or web address]/  Date: 02/03/2023  Prepared by: Staci Pinch    Exercises  Prone Shoulder Horizontal Abduction - 1 x daily - 7 x weekly - 3 sets - 10 reps  Prone Shoulder Extension - Single Arm - 1 x daily - 7 x weekly - 3 sets - 10 reps  Prone Shoulder Row - 1 x daily - 7 x weekly - 3 sets - 10 reps  Prone Shoulder Extension - Single Arm with Dumbbell - 1 x daily - 7 x weekly - 3 sets - 10 reps        Manual Treatments:  PROM all direction within pain free range, side lying scap mobes. Modalities: x    Communication:  POC faxed 12/30/22, PN 1/27/23    Assessment:  (Response towards treatment session) (Pain Rating)  Pt demonstrated good tolerance to treatment with progressed exercises and increased ROM. Noted shoulder/scap substitution w/OH activity. Pt will benefit from continued PT services to restore full use, strength, ROM L shoulder post-surgery. End pain = 0/10 pain       Assessment: Pt is a 81 y.o. male w/DX L shoulder pain. Pt reports having chronic L shoulder pain after breaking his \"collar bone\" in 2005.   Then recently, slipped into a hole in the ground and caught himself w/his left hand. Since then, increased pain noticed and was identified that there was an partial RC tear/tendonitis. Now, pt is s/p L RC repair on 12/12/22. PLOF:  Prior to this surgery, was independent w/all adls w/o left shoulder pain, was able to lift a good amount of weight overhead, had a 50# limit on lifting d/t hx back surgery. 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 Patient will benefit from physical therapy to improve strength, ROM and function of LUE in a graded fashion w/adhearance to precautions and protocols.       Plan for Next Session:   Specific Instructions for Next Treatment: shoulder protocol in soft chart    Time In / Time Out:  1300/1344    Timed Code/Total Treatment Minutes: 44'/44'  1 TE 1MT 1 NR    Next Progress Note due:  10 visits      Plan of Care Interventions:  [x] Therapeutic Exercise  [x] Modalities:  [x] Therapeutic Activity     [x] Ultrasound  [x] Estim  [] Gait Training      [] Cervical Traction [] Lumbar Traction  [x] Neuromuscular Re-education    [] Cold/hotpack [] Iontophoresis   [x] Instruction in HEP      [x] Vasopneumatic   [x] Dry Needling    [x] Manual Therapy               [] Aquatic Therapy              Electronically signed by:  ELYSIA Colmenares 2/7/2023, 12:58 PM

## 2023-02-10 ENCOUNTER — HOSPITAL ENCOUNTER (OUTPATIENT)
Dept: PHYSICAL THERAPY | Age: 73
Setting detail: THERAPIES SERIES
Discharge: HOME | End: 2023-02-10
Payer: OTHER GOVERNMENT

## 2023-02-10 PROCEDURE — 97110 THERAPEUTIC EXERCISES: CPT

## 2023-02-10 PROCEDURE — 97140 MANUAL THERAPY 1/> REGIONS: CPT

## 2023-02-10 NOTE — FLOWSHEET NOTE
Outpatient Physical Therapy  [place]           [x] Phone: [phone]   Fax: [phone]  [facility]           [] Phone: [phone]   Fax: [phone]        Physical Therapy Daily Treatment Note  Date:  2/10/2023    Patient Name:  Sherrell Watkins    :  1950  MRN: [id number]  Restrictions/Precautions: See soft chart/  May begin to lift and work up to 10#. Diagnosis:   Pain in left shoulder [M25.512]    Date of Injury/Surgery: RC repair left shoulder on 22  Treatment Diagnosis:  L shoulder pain, impaired ROM/strength, impaired function  Insurance/Certification information:  [place] - 15 [place]  Referring Physician:  Jolly Graham MD   , Surgeon Dr. Catherine Brewer DO  PCP: Debra Albrecht MD  Next Doctor Visit:  Recent visit, no current appointment to see surgeon (22)    Plan of care signed (Y/N):  Pending  Outcome Measure: Quick Dash 84% disability  Quick Dash 23 = 47.7% disability    Visit# / total visits:   11/15  Pain level: 0/10 pain    Goals:     Patient goals: Return full function/use of LUE. Short term goals  Time Frame for Short term goals: In 4 weeks, patient will  demonstrate compliance and independence w/HEP. - ONGOING/COMPLYING 23  adhere to all precautions for L shoulder RC repair to avoid re-injury of shoulder. - MET 23    Long Term Goals  Time Frame for Long Term Goals: In 8 weeks, patient will  demonstrate compliance and independence w/HEP and shoulder precautions. - ONGOING/COMPLYING 23  gain full PROM L shoulder for advancement toward functional use of shoulder for adls, daily activities. - IMPROVING/ONGOING 23  achieve full AROM by end of week 8 post-operative to allow advancement to intermediate Phase/Phase 3 for a gradual return to functional activities.  - IMPROVING/ONGOING 23  begin rotator cuff isometrics for graded strengthening with healing tissue. - INITIATED/ONGOING 23  score at <= 30% disability on Quick Dash as indication of improvement w/daily activities. - NEW GOAL 1/27/23         Summary of Evaluation:  Assessment: Pt is a 81 y.o. male w/DX L shoulder pain. Pt reports having chronic L shoulder pain after breaking his \"collar bone\" in 2005. Then recently, slipped into a hole in the ground and caught himself w/his left hand. Since then, increased pain noticed and was identified that there was an partial RC tear/tendonitis. Now, pt is s/p L RC repair on 12/12/22. PLOF:  Prior to this surgery, was independent w/all adls w/o left shoulder pain, was able to lift a good amount of weight overhead, had a 50# limit on lifting d/t hx back surgery. 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 Patient will benefit from physical therapy to improve strength, ROM and function of LUE in a graded fashion w/adhearance to precautions and protocols. Subjective:  Pt reports he feels pretty good until he starts to use the arm and then the pain get worse. Any changes in Ambulatory Summary Sheet? None    Objective:  see below      Supine Flexion in plane of scaption 157°    AROM - standing  Scaption 140° w/min compensatory movement  Abduction 120° w/deltoid discomfort and min compensatory movement        Exercises: (No more than 4 columns)   Exercise/Equipment 1/31/23 #8 2/3/23 #9 2/7/23 #10 2/10/23  #11       Week 7.5     WARM UP          Manual       Pulley X 20   Scapular plane X 20   Scapular plane X20 scapular plane 1x20 scaption   UBE 3'F/B 2'/2' f/b 2'/2' f/b 2'/2' f/b   TABLE       Punches/press w cane 2 x 10  1# cane  Vc/demo 2 x 10  1# cane  Vc/demo 2 x 15  1#     Flexion w cane 1 x 10  Cane  90 deg  Sitting  Posture correction.   Increased discomform  Lateral deltoid temporary Vc/tc/demo 1 X10 supine   1 x 10  Cane  90 deg  Sitting  Posture correction. Increased discomform  Lateral deltoid temporary Vc/tc/demo X 20 1#      SL abd   X 20    SL ER   X 20    ABC   X1 1#    prone row/ext Next visit X15 2# X20 2# ea. SL ER       Manual therapy    See below   STANDING       Wall push ups   X 20    Abduction w cane X 15  1# cane X 20  1# cane X 20  1# cane AAROM    Isometric walkout   Dble YTB x 10 ea. Mid Row  X 20 GTB  X30 GTB    extension X 20 GTB  X30 GTB    flexion                          PROPRIOCEPTION       Ball on wall flexion/HA/ cw/ccw X 20 ea X20 ea. X30 ea. Roller up wall 1 x 8  Increased discomfort  Postural substitution  Vc/tc 1 x 10  Increased discomfort  Postural substitution  Vc/tc X10 flex  X10 scap    Functional caudal glide/limiting substitution w/OH activities During all OH activities and TB exercises  Vc/tc/demo      Ball press standing 2 x 10  Med beach ball on table  Vc/tc/demo 2 x 10  Med beach ball on table  Vc/tc/demo     Tband caudal glide 1 x 10  YTB  Vc/tc/demo 2x15      MODALITIES       Vaso Declined/no pain declined                Other Therapeutic Activities/Education:      Home Exercise Program:    Access Code: [email or web address]  URL: [email or web address]  Date: 12/30/2022  Prepared by: Karmen Galaviz    Exercises  Seated Scapular Retraction - 1 x daily - 7 x weekly - 3 sets - 10 reps  Seated Elbow Flexion and Extension AROM - 1 x daily - 7 x weekly - 3 sets - 10 reps  Seated Shoulder Flexion Self PROM - 1 x daily - 7 x weekly - 3 sets - 10 reps  Seated Gripping Towel - 1 x daily - 7 x weekly - 3 sets - 10 reps    Recommended using lesser DB weight with biceps curl at home  Access Code: [facility]  URL: [email or web address]  Date: 01/17/2023  Prepared by: Luigi Tejada    Exercises  Supine Shoulder Flexion Extension AAROM with Dowel - 1 x daily - 7 x weekly - 3 sets - 10 reps  Supine Shoulder External Rotation in 45 Degrees Abduction AAROM with Dowel - 1 x daily - 7 x weekly - 3 sets - 10 reps  Standing Shoulder Abduction AAROM with Dowel - 1 x daily - 7 x weekly - 3 sets - 10 reps  Supine Shoulder Press with Dowel - 1 x daily - 7 x weekly - 3 sets - 10 reps    Access Code: [email or web address]  URL: [email or web address]  Date: 01/24/2023  Prepared by: Yahaira Hair    Exercises  Standing Shoulder Row with Anchored Resistance - 1 x daily - 7 x weekly - 3 sets - 10 reps  Shoulder extension with resistance - Neutral - 1 x daily - 7 x weekly - 3 sets - 10 reps  Sidelying Shoulder External Rotation - 1 x daily - 7 x weekly - 3 sets - 10 reps  Supine Single Arm Shoulder Protraction - 1 x daily - 7 x weekly - 3 sets - 10 reps  Shoulder Flexion Wall Slide with Towel - 1 x daily - 7 x weekly - 3 sets - 10 reps    1/31/23:  shoulder downward press w/YTB for caudal glide and neuro re-ed. Access Code: [email or web address]  URL: [email or web address]  Date: 02/03/2023  Prepared by: Yahaira Hair    Exercises  Prone Shoulder Horizontal Abduction - 1 x daily - 7 x weekly - 3 sets - 10 reps  Prone Shoulder Extension - Single Arm - 1 x daily - 7 x weekly - 3 sets - 10 reps  Prone Shoulder Row - 1 x daily - 7 x weekly - 3 sets - 10 reps  Prone Shoulder Extension - Single Arm with Dumbbell - 1 x daily - 7 x weekly - 3 sets - 10 reps        Manual Treatments:  PROM/MOBS to the left shoulder joint complex with TPR/MFR to palpated hypertonicity    Modalities: x    Communication:  POC faxed 12/30/22, PN 1/27/23    Assessment:  (Response towards treatment session) (Pain Rating)  Pt presents with complaints of biggest issue being his lack of AROM - session more focused on ROM/mobility secondarily. Pt demo's decreased AC joint mobility and decreased GH joint posterior and inferior joint mobility that improves with listed manual interventions. Pt is able to demo notably improved AROM after session today! Pt will benefit from continued PT services to restore full use, strength, ROM L shoulder post-surgery. End pain = 0/10 pain! !      Assessment: Pt is a 72 y.o. male w/DX L shoulder pain. Pt reports having chronic L shoulder pain after breaking his \"collar bone\" in 2005. Then recently, slipped into a hole in the ground and caught himself w/his left hand. Since then, increased pain noticed and was identified that there was an partial RC tear/tendonitis. Now, pt is s/p L RC repair on 12/12/22. PLOF:  Prior to this surgery, was independent w/all adls w/o left shoulder pain, was able to lift a good amount of weight overhead, had a 50# limit on lifting d/t hx back surgery. 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 Patient will benefit from physical therapy to improve strength, ROM and function of LUE in a graded fashion w/adhearance to precautions and protocols.       Plan for Next Session:   Specific Instructions for Next Treatment: shoulder protocol in soft chart    Time In / Time Out:  1348/1426    Timed Code/Total Treatment Minutes:  MAN X 30' X 2;   TE X 8' X 1    Next Progress Note due:  10 visits      Plan of Care Interventions:  [x] Therapeutic Exercise  [x] Modalities:  [x] Therapeutic Activity     [x] Ultrasound  [x] Estim  [] Gait Training      [] Cervical Traction [] Lumbar Traction  [x] Neuromuscular Re-education    [] Cold/hotpack [] Iontophoresis   [x] Instruction in HEP      [x] Vasopneumatic   [x] Dry Needling    [x] Manual Therapy               [] Aquatic Therapy              Electronically signed by:  Marlinda Mcburney II, PTA [id number]        2/10/2023, 7:12 AM

## 2023-02-14 ENCOUNTER — HOSPITAL ENCOUNTER (OUTPATIENT)
Dept: PHYSICAL THERAPY | Age: 73
Setting detail: THERAPIES SERIES
Discharge: HOME | End: 2023-02-14
Payer: OTHER GOVERNMENT

## 2023-02-14 PROCEDURE — 97110 THERAPEUTIC EXERCISES: CPT

## 2023-02-14 PROCEDURE — 97140 MANUAL THERAPY 1/> REGIONS: CPT

## 2023-02-14 NOTE — FLOWSHEET NOTE
Outpatient Physical Therapy  [place]           [x] Phone: [phone]   Fax: [phone]  [place]           [] Phone: [phone]   Fax: [phone]        Physical Therapy Daily Treatment Note  Date:  2023    Patient Name:  Tomas Grijalva    :  1950  MRN: [id number]  Restrictions/Precautions: See soft chart/  May begin to lift and work up to 10#. Diagnosis:   Pain in left shoulder [M25.512]    Date of Injury/Surgery: RC repair left shoulder on 22  Treatment Diagnosis:  L shoulder pain, impaired ROM/strength, impaired function  Insurance/Certification information:  [place] - 15 visits  Referring Physician:  Zakia Rodrigues MD   , Surgeon Dr. Rodrigo Restrepo DO  PCP: Anna Goldstein MD  Next Doctor Visit:  Recent visit, no current appointment to see surgeon (22)    Plan of care signed (Y/N):  Pending  Outcome Measure: Quick Dash 84% disability  Quick Dash 23 = 47.7% disability    Visit# / total visits:   12/15  Pain level: 0/10 pain    Goals:     Patient goals: Return full function/use of LUE. Short term goals  Time Frame for Short term goals: In 4 weeks, patient will  demonstrate compliance and independence w/HEP. - ONGOING/COMPLYING 23  adhere to all precautions for L shoulder RC repair to avoid re-injury of shoulder. - MET 23    Long Term Goals  Time Frame for Long Term Goals: In 8 weeks, patient will  demonstrate compliance and independence w/HEP and shoulder precautions. - ONGOING/COMPLYING 23  gain full PROM L shoulder for advancement toward functional use of shoulder for adls, daily activities. - IMPROVING/ONGOING 23  achieve full AROM by end of week 8 post-operative to allow advancement to intermediate Phase/Phase 3 for a gradual return to functional activities.  - IMPROVING/ONGOING 23  begin rotator cuff isometrics for graded strengthening with healing tissue. - INITIATED/ONGOING 23  score at <= 30% disability on Quick Dash as indication of improvement w/daily activities. - NEW GOAL 1/27/23         Summary of Evaluation:  Assessment: Pt is a 81 y.o. male w/DX L shoulder pain. Pt reports having chronic L shoulder pain after breaking his \"collar bone\" in 2005. Then recently, slipped into a hole in the ground and caught himself w/his left hand. Since then, increased pain noticed and was identified that there was an partial RC tear/tendonitis. Now, pt is s/p L RC repair on 12/12/22. PLOF:  Prior to this surgery, was independent w/all adls w/o left shoulder pain, was able to lift a good amount of weight overhead, had a 50# limit on lifting d/t hx back surgery. 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 Patient will benefit from physical therapy to improve strength, ROM and function of LUE in a graded fashion w/adhearance to precautions and protocols. Subjective:  Pt reports pain with OH activities after a while. Any changes in Ambulatory Summary Sheet? None    Objective:  see below  AROM standing  Scaption 140° w/min compensatory movement  Abduction 120° w/deltoid discomfort and min compensatory movement    L tricep fasciculation/twitching w/PROM > 110 deg (min)      Exercises: (No more than 4 columns)   Exercise/Equipment 2/3/23 #9 2/7/23 #10 2/10/23  #11 2/14/23 #12      Week 7.5      WARM UP          Manual       Pulley X 20   Scapular plane X20 scapular plane 1x20 scaption 1x20 scaption   UBE 2'/2' f/b 2'/2' f/b 2'/2' f/b    TABLE       Punches/press w cane 2 x 10  1# cane  Vc/demo 2 x 15  1#      Flexion w cane 1 X10 supine   1 x 10  Cane  90 deg  Sitting  Posture correction. Increased discomform  Lateral deltoid temporary Vc/tc/demo X 20 1#       SL abd  X 20  X15 1#   SL ER  X 20  X15 1#   ABC  X1 1#  X1 2 #   prone row/ext X15 2# X20 2# ea.      SL ER Manual therapy   See below See below   STANDING       Wall push ups  X 20  Counter   x10   Abduction w cane X 20  1# cane X 20  1# cane AAROM  AROM x10   Isometric walkout  Dble YTB x 10 ea. Mid Row   X30 GTB     extension  X30 GTB     flexion       D2 flexion w TB    YTB x 10               PROPRIOCEPTION       Ball on wall flexion/HA/ cw/ccw X20 ea. X30 ea. X30 ea. Roller up wall 1 x 10  Increased discomfort  Postural substitution  Vc/tc X10 flex  X10 scap     Functional caudal glide/limiting substitution w/OH activities       Ball press standing 2 x 10  Med beach ball on table  Vc/tc/demo      Tband caudal glide 2x15       MODALITIES       Vaso declined                 Other Therapeutic Activities/Education:      Home Exercise Program:    Access Code: [email or web address]  URL: [email or web address]  Date: 12/30/2022  Prepared by: Su Salazar    Exercises  Seated Scapular Retraction - 1 x daily - 7 x weekly - 3 sets - 10 reps  Seated Elbow Flexion and Extension AROM - 1 x daily - 7 x weekly - 3 sets - 10 reps  Seated Shoulder Flexion Self PROM - 1 x daily - 7 x weekly - 3 sets - 10 reps  Seated Gripping Towel - 1 x daily - 7 x weekly - 3 sets - 10 reps    Recommended using lesser DB weight with biceps curl at home  Access Code: [facility]  URL: [email or web address]/  Date: 01/17/2023  Prepared by: Leslie Milch    Exercises  Supine Shoulder Flexion Extension AAROM with Dowel - 1 x daily - 7 x weekly - 3 sets - 10 reps  Supine Shoulder External Rotation in 45 Degrees Abduction AAROM with Dowel - 1 x daily - 7 x weekly - 3 sets - 10 reps  Standing Shoulder Abduction AAROM with Dowel - 1 x daily - 7 x weekly - 3 sets - 10 reps  Supine Shoulder Press with Dowel - 1 x daily - 7 x weekly - 3 sets - 10 reps    Access Code: [email or web address]  URL: [email or web address]/  Date: 01/24/2023  Prepared by: Leslie Farmerch    Exercises  Standing Shoulder Row with Anchored Resistance - 1 x daily - 7 x weekly - 3 sets - 10 reps  Shoulder extension with resistance - Neutral - 1 x daily - 7 x weekly - 3 sets - 10 reps  Sidelying Shoulder External Rotation - 1 x daily - 7 x weekly - 3 sets - 10 reps  Supine Single Arm Shoulder Protraction - 1 x daily - 7 x weekly - 3 sets - 10 reps  Shoulder Flexion Wall Slide with Towel - 1 x daily - 7 x weekly - 3 sets - 10 reps    1/31/23:  shoulder downward press w/YTB for caudal glide and neuro re-ed. Access Code: [email or web address]  URL: [email or web address]  Date: 02/14/2023  Prepared by: Alvarado Fruit    Exercises  Prone Shoulder Horizontal Abduction - 1 x daily - 7 x weekly - 3 sets - 10 reps  Prone Shoulder Extension - Single Arm - 1 x daily - 7 x weekly - 3 sets - 10 reps  Prone Shoulder Row - 1 x daily - 7 x weekly - 3 sets - 10 reps  Prone Shoulder Extension - Single Arm with Dumbbell - 1 x daily - 7 x weekly - 3 sets - 10 reps  Standing Shoulder Single Arm PNF D2 Flexion with Resistance - 1 x daily - 7 x weekly - 3 sets - 10 reps          Manual Treatments:  PROM/MOBS to the left shoulder joint complex with TPR/MFR to palpated hypertonicity    Modalities: x    Communication:  POC faxed 12/30/22, PN 1/27/23    Assessment:  (Response towards treatment session) (Pain Rating)  Pt demonstrated good tolerance to xt with steady ROM and increase in resistance. Updated HEP. Progressing well. Patient will be contacting [place] for more visits. Pt will benefit from continued PT services to restore full use, strength, ROM L shoulder post-surgery. End pain = 0/10 pain      Assessment: Pt is a 81 y.o. male w/DX L shoulder pain. Pt reports having chronic L shoulder pain after breaking his \"collar bone\" in 2005. Then recently, slipped into a hole in the ground and caught himself w/his left hand. Since then, increased pain noticed and was identified that there was an partial RC tear/tendonitis. Now, pt is s/p L RC repair on 12/12/22.   PLOF:  Prior to this surgery, was independent w/all adls w/o left shoulder pain, was able to lift a good amount of weight overhead, had a 50# limit on lifting d/t hx back surgery. 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 Patient will benefit from physical therapy to improve strength, ROM and function of LUE in a graded fashion w/adhearance to precautions and protocols.       Plan for Next Session:   Specific Instructions for Next Treatment: shoulder protocol in soft chart    Time In / Time Out:  1300/1340    Timed Code/Total Treatment Minutes: 2 TE 1 TE    Next Progress Note due:  10 visits      Plan of Care Interventions:  [x] Therapeutic Exercise  [x] Modalities:  [x] Therapeutic Activity     [x] Ultrasound  [x] Estim  [] Gait Training      [] Cervical Traction [] Lumbar Traction  [x] Neuromuscular Re-education    [] Cold/hotpack [] Iontophoresis   [x] Instruction in HEP      [x] Vasopneumatic   [x] Dry Needling    [x] Manual Therapy               [] Aquatic Therapy              Electronically signed by:  ELYSIA Jackson  2/14/2023, 12:57 PM

## 2023-02-17 ENCOUNTER — HOSPITAL ENCOUNTER (OUTPATIENT)
Dept: PHYSICAL THERAPY | Age: 73
Setting detail: THERAPIES SERIES
Discharge: HOME | End: 2023-02-17
Payer: OTHER GOVERNMENT

## 2023-02-17 PROCEDURE — 97112 NEUROMUSCULAR REEDUCATION: CPT

## 2023-02-17 PROCEDURE — 97140 MANUAL THERAPY 1/> REGIONS: CPT

## 2023-02-17 PROCEDURE — 97110 THERAPEUTIC EXERCISES: CPT

## 2023-02-17 NOTE — PROGRESS NOTES
Physical Therapy      Outpatient Physical Therapy           [place]           [] Phone: [phone]   Fax: [phone]  [place]           [] Phone: [phone]   Fax: [phone]      To: Levon Medley MD     From: Anabelle Rosado, PT    Patient: Christie Neal                    : 1950  Diagnosis:  Pain in left shoulder [M25.512]        Treatment Diagnosis:   L shoulder pain, impaired ROM/strength, impaired function    Date: 2023  [x]  Progress Note                []  Discharge Note    Evaluation Date:  22   Total Visits to date:   15 Cancels/No-shows to date:  0    Subjective:    0/10 pain, worst is 5/10 when lifting heavy objects at home (15#)  Per pt, has no more f/u appointments w/surgeon. Pt feels he is improving, however would like to continue physical therapy to increase strength and ROM to prior function. Plan of Care/Treatment to date:  [x] Therapeutic Exercise    [x] Modalities:  [x] Therapeutic Activity     [] Ultrasound  [] Electrical Stimulation  [] Gait Training      [] Cervical Traction   [] Lumbar Traction  [x] Neuromuscular Re-education  [] Cold/hotpack [] Iontophoresis  [x] Instruction in HEP      Other:  [x] Manual Therapy       [x]  Vasopneumatic  [] Aquatic Therapy       [x]   Dry Needle Therapy                      Objective/Significant Findings At Last Visit/Comments:      Quick Dash = 30% disability (was 84% at evaluation)  AROM standing  Scaption 140° w/min compensatory movement/improved w/tc and vc  Abduction 132° w/deltoid discomfort and min compensatory movement/improved w/tc and vc     Tendency for superior glide of humeral head w/OH activities. PROM supine   deg  Abd 149 deg  IR 68 deg  ER 56 deg    MMT:  Shoulder strength 3+/5 in available ROM. Able to begin Phase 3 in clinic, w/verbal and tactile cues for technique and to avoid compensatory scapular  substitution.     Assessment:     Pt demonstrates good tolerance for therapy, with improved ROM and strength noted. Improved strength/ROM. Able to progress to Phase 3 post-shoulder rehab w/minimal corrections for technique/compensatory movement. Progressing well. Patient will be contacting [address] [place] [address] for more visits. Pt will benefit from continued PT services to restore full use, strength, ROM L shoulder post-surgery    Goal Status:  [] Achieved [x] Partially Achieved  [] Not Achieved   Patient goals: Return full function/use of LUE. Short term goals  Time Frame for Short term goals: In 4 weeks, patient will  demonstrate compliance and independence w/HEP. - ONGOING/COMPLYING 2/17/23  adhere to all precautions for L shoulder RC repair to avoid re-injury of shoulder. - MET 1/20/23, 2/17/23     Long Term Goals  Time Frame for Long Term Goals: In 8 weeks, patient will  demonstrate compliance and independence w/HEP and shoulder precautions. - ONGOING/COMPLYING 2/17/223  gain full PROM L shoulder for advancement toward functional use of shoulder for adls, daily activities. - IMPROVING/ONGOING 2/17/23  achieve full AROM by end of week 8 post-operative to allow advancement to intermediate Phase/Phase 3 for a gradual return to functional activities. - IMPROVING/ONGOING 2/17/23  begin rotator cuff isometrics for graded strengthening with healing tissue. - INITIATED/ONGOING 1/27/23  score at <= 30% disability on Quick Dash as indication of improvement w/daily activities.  - MET W/ROOM FOR IMPROVEMENT 2/17/23    Frequency/Duration:  # Days per week: [] 1 day # Weeks: [] 1 week [x] 4 weeks [] 8 weeks     [x] 2 days   [] 2 weeks [] 5 weeks [] 10 weeks     [] 3 days   [] 3 weeks [] 6 weeks [] 12 weeks       Rehab Potential: [] Excellent [x] Good [] Fair  [] Poor         Patient Status: [x] Continue per initial plan of Care     [] Patient now discharged     [] Additional visits requested, Please re-certify for additional visits:      Requested frequency/duration:  2x/week for 4 weeks    If we are requesting more visits, we fully anticipate the patient's condition is expected to improve within the treatment timeframe we are requesting. Electronically signed by:  Vela Babinski, PT  2/17/2023, 4:50 PM    If you have any questions or concerns, please don't hesitate to call.   Thank you for your referral.    Physician Signature:______________________ Date:______ Time: ________  By signing above, therapists plan is approved by physician

## 2023-02-17 NOTE — FLOWSHEET NOTE
Outpatient Physical Therapy  [place]           [x] Phone: [phone]   Fax: [phone]  [facility]           [] Phone: [phone]   Fax: [phone]        Physical Therapy Daily Treatment Note  Date:  2023    Patient Name:  Mynor Lyons    :  1950  MRN: [id number]  Restrictions/Precautions: See soft chart/  May begin to lift and work up to 10#. Diagnosis:   Pain in left shoulder [M25.512]    Date of Injury/Surgery: RC repair left shoulder on 22  Treatment Diagnosis:  L shoulder pain, impaired ROM/strength, impaired function  Insurance/Certification information:  [place] - 15 [place]  Referring Physician:  Scarlet Perez MD   , Surgeon Dr. Dieudonne Hammer DO  PCP: Tami Bañuelos MD  Next Doctor Visit:  Recent visit, no current appointment to see surgeon (22sm)    Plan of care signed (Y/N):  Pending  Outcome Measure: Quick Dash 84% disability  Quick Dash 23 = 47.7% disability  Quick Dash 23 = 30% disability    Visit# / total visits:   13/15  Pain level: 0/10 pain, worst is 5/10 when lifting heavy objects at home (15#)    Goals:     Patient goals: Return full function/use of LUE. Short term goals  Time Frame for Short term goals: In 4 weeks, patient will  demonstrate compliance and independence w/HEP. - ONGOING/COMPLYING 23  adhere to all precautions for L shoulder RC repair to avoid re-injury of shoulder. - MET 23, 23    Long Term Goals  Time Frame for Long Term Goals: In 8 weeks, patient will  demonstrate compliance and independence w/HEP and shoulder precautions. - ONGOING/COMPLYING   gain full PROM L shoulder for advancement toward functional use of shoulder for adls, daily activities. - IMPROVING/ONGOING 23  achieve full AROM by end of week 8 post-operative to allow advancement to intermediate Phase/Phase 3 for a gradual return to functional activities.  - IMPROVING/ONGOING 23  begin rotator cuff isometrics for graded strengthening with healing tissue. - INITIATED/ONGOING 1/27/23  score at <= 30% disability on Quick Dash as indication of improvement w/daily activities. - MET 2/17/23         Summary of Evaluation:  Assessment: Pt is a 81 y.o. male w/DX L shoulder pain. Pt reports having chronic L shoulder pain after breaking his \"collar bone\" in 2005. Then recently, slipped into a hole in the ground and caught himself w/his left hand. Since then, increased pain noticed and was identified that there was an partial RC tear/tendonitis. Now, pt is s/p L RC repair on 12/12/22. PLOF:  Prior to this surgery, was independent w/all adls w/o left shoulder pain, was able to lift a good amount of weight overhead, had a 50# limit on lifting d/t hx back surgery. 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 Patient will benefit from physical therapy to improve strength, ROM and function of LUE in a graded fashion w/adhearance to precautions and protocols. Subjective:  Pt reports pain with OH activities after a while. Keeping lifting loads to 10 - 15#. Per pt, has no more f/u appointments w/surgeon. Any changes in Ambulatory Summary Sheet? None    Objective:  see below  AROM standing  Scaption 140° w/min compensatory movement/improved w/tc and vc  Abduction 132° w/deltoid discomfort and min compensatory movement/improved w/tc and vc    Tendency for superior glide of humeral head w/OH activities. PROM supine   deg  Abd 149 deg  IR 68 deg  ER 56 deg    Able to begin Phase 3 in clinic, w/verbal and tactile cues for technique and to avoid compensatory scapular  substitution.       Exercises: (No more than 4 columns)   Exercise/Equipment 2/7/23 #10 2/10/23  #11 2/14/23 #12 2/17/23 #13       Week 8.5  Week 9.5   WARM UP          Manual       Pulley X20 scapular plane 1x20 scaption 1x20 scaption    UBE 2'/2' f/b 2'/2' f/b  2'F 2'B   TABLE       Punches/press w cane 2 x 15  1#       Flexion w cane X 20 1#        SL abd X 20  X15 1#    SL ER X 20  X15 1#    ABC X1 1#  X1 2 #    prone row/ext X20 2# ea. SL ER       IR/ER    1 x 10  YTB  Vc/tc     Horizontal abd/add    1 x 10  YTB  Vc/tc/demo     PNF D1/D2 flex/ext    1 x 10  YTB  Vc/tc/demo   Quadraped wt shifting    2 x 10   Manual therapy  See below See below    STANDING       Wall push ups X 20  Counter   x10 Counter height  2 x 15   Abduction w cane X 20  1# cane AAROM  AROM x10    Isometric walkout Dble YTB x 10 ea. Mid Row  X30 GTB      extension X30 GTB      flexion       D2 flexion w TB   YTB x 10 NEXT VISIT:  Supine if scapular substitution w/standing               PROPRIOCEPTION       Ball on wall flexion/HA/ cw/ccw X30 ea. X30 ea. Roller up wall X10 flex  X10 scap      Functional caudal glide/limiting substitution w/OH activities       Ball press standing       Tband caudal glide       MODALITIES       Vaso                  Other Therapeutic Activities/Education:      Home Exercise Program:    Access Code: [email or web address]  URL: [email or web address]/  Date: 12/30/2022  Prepared by: Diego Henriquez    Exercises  Seated Scapular Retraction - 1 x daily - 7 x weekly - 3 sets - 10 reps  Seated Elbow Flexion and Extension AROM - 1 x daily - 7 x weekly - 3 sets - 10 reps  Seated Shoulder Flexion Self PROM - 1 x daily - 7 x weekly - 3 sets - 10 reps  Seated Gripping Towel - 1 x daily - 7 x weekly - 3 sets - 10 reps    Recommended using lesser DB weight with biceps curl at home  Access Code: [facility]  URL: [email or web address]/  Date: 01/17/2023  Prepared by: Thuan Carias    Exercises  Supine Shoulder Flexion Extension AAROM with Dowel - 1 x daily - 7 x weekly - 3 sets - 10 reps  Supine Shoulder External Rotation in 45 Degrees Abduction AAROM with Dowel - 1 x daily - 7 x weekly - 3 sets - 10 reps  Standing Shoulder Abduction AAROM with Dowel - 1 x daily - 7 x weekly - 3 sets - 10 reps  Supine Shoulder Press with Dowel - 1 x daily - 7 x weekly - 3 sets - 10 reps    Access Code: [email or web address]  URL: [email or web address]  Date: 01/24/2023  Prepared by: Waunita Mendoza    Exercises  Standing Shoulder Row with Anchored Resistance - 1 x daily - 7 x weekly - 3 sets - 10 reps  Shoulder extension with resistance - Neutral - 1 x daily - 7 x weekly - 3 sets - 10 reps  Sidelying Shoulder External Rotation - 1 x daily - 7 x weekly - 3 sets - 10 reps  Supine Single Arm Shoulder Protraction - 1 x daily - 7 x weekly - 3 sets - 10 reps  Shoulder Flexion Wall Slide with Towel - 1 x daily - 7 x weekly - 3 sets - 10 reps    1/31/23:  shoulder downward press w/YTB for caudal glide and neuro re-ed. Access Code: [email or web address]  URL: [email or web address]  Date: 02/14/2023  Prepared by: Waunita Mendoza    Exercises  Prone Shoulder Horizontal Abduction - 1 x daily - 7 x weekly - 3 sets - 10 reps  Prone Shoulder Extension - Single Arm - 1 x daily - 7 x weekly - 3 sets - 10 reps  Prone Shoulder Row - 1 x daily - 7 x weekly - 3 sets - 10 reps  Prone Shoulder Extension - Single Arm with Dumbbell - 1 x daily - 7 x weekly - 3 sets - 10 reps  Standing Shoulder Single Arm PNF D2 Flexion with Resistance - 1 x daily - 7 x weekly - 3 sets - 10 reps          Manual Treatments:  PROM/MOBS to the left shoulder joint complex with TPR/MFR to palpated hypertonicity    Modalities: x    Communication:  POC faxed 12/30/22, PN 1/27/23    Assessment:  (Response towards treatment session) (Pain Rating)  Pt demonstrated good tolerance to xt with steady ROM and increase in resistance. Able to progress to Phase 3 post-shoulder rehab w/minimal corrections for technique/compensatory movement. Progressing well. Patient will be contacting [facility] for more visits. Pt will benefit from continued PT services to restore full use, strength, ROM L shoulder post-surgery.   End pain = 0/10 pain      Assessment: Pt is a 81 y.o. male w/DX L shoulder pain. Pt reports having chronic L shoulder pain after breaking his \"collar bone\" in 2005. Then recently, slipped into a hole in the ground and caught himself w/his left hand. Since then, increased pain noticed and was identified that there was an partial RC tear/tendonitis. Now, pt is s/p L RC repair on 12/12/22. PLOF:  Prior to this surgery, was independent w/all adls w/o left shoulder pain, was able to lift a good amount of weight overhead, had a 50# limit on lifting d/t hx back surgery. 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 Patient will benefit from physical therapy to improve strength, ROM and function of LUE in a graded fashion w/adhearance to precautions and protocols. Plan for Next Session: Proceed w/Phase 3 in clinic. Do no prescribe any HEP that cannot be performed w/o compensatory movement.   Specific Instructions for Next Treatment: shoulder protocol in soft chart    Time In / Time Out:  1255/1345    Timed Code/Total Treatment Minutes: 50'/50'  TE 20' (1), Manual 15' (1), NRE 15' (1)    Next Progress Note due:  10 visits      Plan of Care Interventions:  [x] Therapeutic Exercise  [x] Modalities:  [x] Therapeutic Activity     [x] Ultrasound  [x] Estim  [] Gait Training      [] Cervical Traction [] Lumbar Traction  [x] Neuromuscular Re-education    [] Cold/hotpack [] Iontophoresis   [x] Instruction in HEP      [x] Vasopneumatic   [x] Dry Needling    [x] Manual Therapy               [] Aquatic Therapy              Electronically signed by:  Tierney Cedillo PT   2/17/2023, 12:59 PM

## 2023-02-24 ENCOUNTER — HOSPITAL ENCOUNTER (OUTPATIENT)
Dept: PHYSICAL THERAPY | Age: 73
Setting detail: THERAPIES SERIES
Discharge: HOME | End: 2023-02-24
Payer: OTHER GOVERNMENT

## 2023-02-24 PROCEDURE — 97110 THERAPEUTIC EXERCISES: CPT

## 2023-02-24 PROCEDURE — 97112 NEUROMUSCULAR REEDUCATION: CPT

## 2023-02-24 PROCEDURE — 97140 MANUAL THERAPY 1/> REGIONS: CPT

## 2023-02-24 NOTE — FLOWSHEET NOTE
Outpatient Physical Therapy  [place]           [x] Phone: [phone]   Fax: [phone]  [place]           [] Phone: [phone]   Fax: [phone]        Physical Therapy Daily Treatment Note  Date:  2023    Patient Name:  Horacio Petersen    :  1950  MRN: [id number]  Restrictions/Precautions: See soft chart/  May begin to lift and work up to 10#. Diagnosis:   Pain in left shoulder [M25.512]    Date of Injury/Surgery: RC repair left shoulder on 22  Treatment Diagnosis:  L shoulder pain, impaired ROM/strength, impaired function  Insurance/Certification information:  [place] - 15 visits  Referring Physician:  Charla Jackson MD   , Surgeon Dr. Ag Coronado DO  PCP: Pelon Quintero MD  Next Doctor Visit:  Recent visit, no current appointment to see surgeon (22sm)    Plan of care signed (Y/N):  Pending  Outcome Measure: Quick Dash 84% disability  Quick Dash 23 = 47.7% disability  Quick Dash 23 = 30% disability    Visit# / total visits:   15  Pain level: 0/10 pain, worst is 5/10 when lifting heavy objects at home (15#)    Goals:     Patient goals: Return full function/use of LUE. Short term goals  Time Frame for Short term goals: In 4 weeks, patient will  demonstrate compliance and independence w/HEP. - ONGOING/COMPLYING 23  adhere to all precautions for L shoulder RC repair to avoid re-injury of shoulder. - MET 23, 23    Long Term Goals  Time Frame for Long Term Goals: In 8 weeks, patient will  demonstrate compliance and independence w/HEP and shoulder precautions. - ONGOING/COMPLYING   gain full PROM L shoulder for advancement toward functional use of shoulder for adls, daily activities. - IMPROVING/ONGOING 23  achieve full AROM by end of week 8 post-operative to allow advancement to intermediate Phase/Phase 3 for a gradual return to functional activities.  - IMPROVING/ONGOING 23  begin rotator cuff isometrics for graded strengthening with healing tissue. - INITIATED/ONGOING 1/27/23  score at <= 30% disability on Quick Dash as indication of improvement w/daily activities. - MET 2/17/23         Summary of Evaluation:  Assessment: Pt is a 81 y.o. male w/DX L shoulder pain. Pt reports having chronic L shoulder pain after breaking his \"collar bone\" in 2005. Then recently, slipped into a hole in the ground and caught himself w/his left hand. Since then, increased pain noticed and was identified that there was an partial RC tear/tendonitis. Now, pt is s/p L RC repair on 12/12/22. PLOF:  Prior to this surgery, was independent w/all adls w/o left shoulder pain, was able to lift a good amount of weight overhead, had a 50# limit on lifting d/t hx back surgery. 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 Patient will benefit from physical therapy to improve strength, ROM and function of LUE in a graded fashion w/adhearance to precautions and protocols. Subjective:  Pt reports pain with OH activities after a while. Keeping lifting loads to 10 - 15#. Per pt, has no more f/u appointments w/surgeon. Any changes in Ambulatory Summary Sheet? None    Objective:  see below  AROM standing  Scaption 140° w/min compensatory movement/improved w/tc and vc  Abduction 132° w/deltoid discomfort and min compensatory movement/improved w/tc and vc    Tendency for superior glide of humeral head w/OH activities. PROM supine   deg  Abd 149 deg  IR 68 deg  ER 56 deg    Able to begin Phase 3 in clinic, w/verbal and tactile cues for technique and to avoid compensatory scapular  substitution.       Exercises: (No more than 4 columns)   Exercise/Equipment 2/14/23 #12 2/17/23 #13 2/24/23 #14       Week 9.5 Week 10.5   WARM UP         Manual      Pulley 1x20 scaption  1 x 20 scaption   UBE 2'F 2'B 3'F/3'B   TABLE      Punches/press w cane      Flexion w cane      SL abd X15 1#     SL ER X15 1#     ABC X1 2 #     prone row/ext   2 x 15 ea  2# HW   Prone abd   1 x 5  No wt  Difficult  Anterior shoulder capsule tight vs scapular activation. SL ER      IR/ER  1 x 10  YTB  Vc/tc      Horizontal abd/add  1 x 10  YTB  Vc/tc/demo      PNF D1/D2 flex/ext  1 x 10  YTB  Vc/tc/demo Standing  Attempted,  Too much compensation. Perform supine next visit. Quadraped wt shifting  2 x 10 2 x 10   Manual therapy See below  See below   STANDING      Wall push ups Counter   x10 Counter height  2 x 15 Mat  45 deg angle  2 x 15  Vc/tc/demo   Abduction w cane AROM x10  AROM x 10   Isometric walkout      Mid Row       extension      flexion      D2 flexion w TB YTB x 10 NEXT VISIT:  Supine if scapular substitution w/standing Supine next visit    Shoulder FF        2 x 10  1# wt  To 90 deg   Shoulder scaption   2 x 10  1# wt  To 90 deg   PROPRIOCEPTION      Ball on wall flexion/HA/ cw/ccw X30 ea. Roller up wall      Functional caudal glide/limiting substitution w/OH activities      Ball press standing      Tband caudal glide      MODALITIES      Vaso                Other Therapeutic Activities/Education:      Home Exercise Program:    Access Code: [email or web address]  URL: [email or web address]/  Date: 12/30/2022  Prepared by: Darryn Smith    Exercises  Seated Scapular Retraction - 1 x daily - 7 x weekly - 3 sets - 10 reps  Seated Elbow Flexion and Extension AROM - 1 x daily - 7 x weekly - 3 sets - 10 reps  Seated Shoulder Flexion Self PROM - 1 x daily - 7 x weekly - 3 sets - 10 reps  Seated Gripping Towel - 1 x daily - 7 x weekly - 3 sets - 10 reps    Recommended using lesser DB weight with biceps curl at home  Access Code: [facility]  URL: [email or web address]/  Date: 01/17/2023  Prepared by: Gianna Fernando    Exercises  Supine Shoulder Flexion Extension AAROM with Dowel - 1 x daily - 7 x weekly - 3 sets - 10 reps  Supine Shoulder External Rotation in 45 Degrees Abduction AAROM with Dowel - 1 x daily - 7 x weekly - 3 sets - 10 reps  Standing Shoulder Abduction AAROM with Dowel - 1 x daily - 7 x weekly - 3 sets - 10 reps  Supine Shoulder Press with Dowel - 1 x daily - 7 x weekly - 3 sets - 10 reps    Access Code: [email or web address]  URL: [email or web address]/  Date: 01/24/2023  Prepared by: Kal Lane    Exercises  Standing Shoulder Row with Anchored Resistance - 1 x daily - 7 x weekly - 3 sets - 10 reps  Shoulder extension with resistance - Neutral - 1 x daily - 7 x weekly - 3 sets - 10 reps  Sidelying Shoulder External Rotation - 1 x daily - 7 x weekly - 3 sets - 10 reps  Supine Single Arm Shoulder Protraction - 1 x daily - 7 x weekly - 3 sets - 10 reps  Shoulder Flexion Wall Slide with Towel - 1 x daily - 7 x weekly - 3 sets - 10 reps    1/31/23:  shoulder downward press w/YTB for caudal glide and neuro re-ed. Access Code: [email or web address]  URL: [email or web address]  Date: 02/14/2023  Prepared by: Kal Brilliant    Exercises  Prone Shoulder Horizontal Abduction - 1 x daily - 7 x weekly - 3 sets - 10 reps  Prone Shoulder Extension - Single Arm - 1 x daily - 7 x weekly - 3 sets - 10 reps  Prone Shoulder Row - 1 x daily - 7 x weekly - 3 sets - 10 reps  Prone Shoulder Extension - Single Arm with Dumbbell - 1 x daily - 7 x weekly - 3 sets - 10 reps  Standing Shoulder Single Arm PNF D2 Flexion with Resistance - 1 x daily - 7 x weekly - 3 sets - 10 reps          Manual Treatments:  PROM/MOBS to the left shoulder joint complex with TPR/MFR to palpated hypertonicity    Modalities: x    Communication:  POC faxed 12/30/22, PN 1/27/23    Assessment:  (Response towards treatment session) (Pain Rating)  Pt demonstrated good tolerance to xt with steady ROM and increase in resistance. Able to progress to Phase 3 post-shoulder rehab w/minimal corrections for technique/compensatory movement. Progressing well.  Patient will be contacting [facility] for more visits. Pt will benefit from continued PT services to restore full use, strength, ROM L shoulder post-surgery. End pain = 0/10 pain      Assessment: Pt is a 81 y.o. male w/DX L shoulder pain. Pt reports having chronic L shoulder pain after breaking his \"collar bone\" in 2005. Then recently, slipped into a hole in the ground and caught himself w/his left hand. Since then, increased pain noticed and was identified that there was an partial RC tear/tendonitis. Now, pt is s/p L RC repair on 12/12/22. PLOF:  Prior to this surgery, was independent w/all adls w/o left shoulder pain, was able to lift a good amount of weight overhead, had a 50# limit on lifting d/t hx back surgery. 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 Patient will benefit from physical therapy to improve strength, ROM and function of LUE in a graded fashion w/adhearance to precautions and protocols. Plan for Next Session: Proceed w/Phase 3 in clinic. Do no prescribe any HEP that cannot be performed w/o compensatory movement.   Specific Instructions for Next Treatment: shoulder protocol in soft chart    Time In / Time Out:  1306/1350   TE 24' (2), Manual 10' (1), NRE 10' (1)    Timed Code/Total Treatment Minutes: Next Progress Note due:  3/17/23 (Only approved for 15 visits)      Plan of Care Interventions:  [x] Therapeutic Exercise  [x] Modalities:  [x] Therapeutic Activity     [x] Ultrasound  [x] Estim  [] Gait Training      [] Cervical Traction [] Lumbar Traction  [x] Neuromuscular Re-education    [] Cold/hotpack [] Iontophoresis   [x] Instruction in HEP      [x] Vasopneumatic   [x] Dry Needling    [x] Manual Therapy               [] Aquatic Therapy              Electronically signed by:  Maira Ramsay, PT   2/24/2023, 1:13 PM

## 2023-03-17 ENCOUNTER — HOSPITAL ENCOUNTER (OUTPATIENT)
Dept: PHYSICAL THERAPY | Age: 73
Setting detail: THERAPIES SERIES
Discharge: HOME | End: 2023-03-17
Payer: OTHER GOVERNMENT

## 2023-03-17 PROCEDURE — 97140 MANUAL THERAPY 1/> REGIONS: CPT

## 2023-03-17 PROCEDURE — 97110 THERAPEUTIC EXERCISES: CPT

## 2023-03-17 NOTE — FLOWSHEET NOTE
Outpatient Physical Therapy  [place]           [x] Phone: [phone]   Fax: [phone]  [place]           [] Phone: [phone]   Fax: [phone]        Physical Therapy Daily Treatment Note  Date:  3/17/2023    Patient Name:  Lindsay Browne    :  1950  MRN: [id number]  Restrictions/Precautions: See soft chart/  May begin to lift and work up to 10#. Diagnosis:   Pain in left shoulder [M25.512]    Date of Injury/Surgery: RC repair left shoulder on 22  Treatment Diagnosis:  L shoulder pain, impaired ROM/strength, impaired function  Insurance/Certification information:  [place] - 30 visits  Referring Physician:  Jose Luis Linares MD   , Surgeon Dr. Jac Gonzalez DO  PCP: Randee Reddy MD  Next Doctor Visit:  Recent visit, no current appointment to see surgeon (22)    Plan of care signed (Y/N):  Pending  Outcome Measure: Quick Dash 84% disability  Quick Dash 23 = 47.7% disability  Quick Dash 23 = 30% disability    Visit# / total visits:   15/30  Pain level: 0/10 pain,    Goals:     Patient goals: Return full function/use of LUE. Short term goals  Time Frame for Short term goals: In 4 weeks, patient will  demonstrate compliance and independence w/HEP. - ONGOING/COMPLYING 23  adhere to all precautions for L shoulder RC repair to avoid re-injury of shoulder. - MET 23, 23    Long Term Goals  Time Frame for Long Term Goals: In 8 weeks, patient will  demonstrate compliance and independence w/HEP and shoulder precautions. - ONGOING/COMPLYING   gain full PROM L shoulder for advancement toward functional use of shoulder for adls, daily activities. - IMPROVING/ONGOING 23  achieve full AROM by end of week 8 post-operative to allow advancement to intermediate Phase/Phase 3 for a gradual return to functional activities.  - IMPROVING/ONGOING 23  begin rotator cuff isometrics for graded strengthening with healing tissue. - INITIATED/ONGOING 23  score at <= 30% disability on Quick Dash as indication of improvement w/daily activities. - MET 2/17/23         Summary of Evaluation:  Assessment: Pt is a 81 y.o. male w/DX L shoulder pain. Pt reports having chronic L shoulder pain after breaking his \"collar bone\" in 2005. Then recently, slipped into a hole in the ground and caught himself w/his left hand. Since then, increased pain noticed and was identified that there was an partial RC tear/tendonitis. Now, pt is s/p L RC repair on 12/12/22. PLOF:  Prior to this surgery, was independent w/all adls w/o left shoulder pain, was able to lift a good amount of weight overhead, had a 50# limit on lifting d/t hx back surgery. 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 Patient will benefit from physical therapy to improve strength, ROM and function of LUE in a graded fashion w/adhearance to precautions and protocols. Subjective:  Pt stated that he wasn't having pain today. Pt stated that   Any changes in Ambulatory Summary Sheet? None    Objective:  see below  After manual  PROM supine  °  Abd 168°  IR  73°  ER 57°      Exercises: (No more than 4 columns)   Exercise/Equipment 2/14/23 #12 2/17/23 #13 2/24/23 #14 3/17/2023 # 15       Week 9.5 Week 10.5    WARM UP          Manual       [place] 1x20 scaption  1 x 20 scaption X 20 scaption   UBE  2'F 2'B 3'F/3'B 3'/3'   TABLE       Punches/press w cane    3# 10x2   Flexion w cane    2# 10x2   SL abd X15 1#   1# 10x2   SL ER X15 1#   2# 10x2   ABC X1 2 #      prone row/ext   2 x 15 ea  2# HW    Prone abd   1 x 5  No wt  Difficult  Anterior shoulder capsule tight vs scapular activation.     D2 flexion    1# 10 x 2    SL ER       IR/ER  1 x 10  YTB  Vc/tc       Horizontal abd/add  1 x 10  YTB  Vc/tc/demo       PNF D1/D2 flex/ext  1 x 10  YTB  Vc/tc/demo Standing  Attempted,  Too much compensation. Perform supine next visit. Quadraped wt shifting  2 x 10 2 x 10    Manual therapy See below  See below    STANDING       Wall push ups Counter   x10 Counter height  2 x 15 Mat  45 deg angle  2 x 15  Vc/tc/demo Mat 10x2   Abduction w cane AROM x10  AROM x 10    Isometric walkout       Mid Row        extension       flexion       Standing modifed row    2# 10x2   Standing modifed extension    2# 10x2   D2 flexion w TB YTB x 10 NEXT VISIT:  Supine if scapular substitution w/standing Supine next visit See above    Shoulder FF        2 x 10  1# wt  To 90 deg 2# 10 x 2 to 90   Shoulder scaption   2 x 10  1# wt  To 90 deg 2# 10 x 2 to 90   PROPRIOCEPTION       Ball on wall flexion/HA/ cw/ccw X30 ea. Roller up wall       Functional caudal glide/limiting substitution w/OH activities       Ball press standing       Tband caudal glide       MODALITIES       Vaso                  Other Therapeutic Activities/Education:      Home Exercise Program:    Access Code: [email or web address]  URL: [employer]/  Date: 12/30/2022  Prepared by: Jacob Waters    Exercises  Seated Scapular Retraction - 1 x daily - 7 x weekly - 3 sets - 10 reps  Seated Elbow Flexion and Extension AROM - 1 x daily - 7 x weekly - 3 sets - 10 reps  Seated Shoulder Flexion Self PROM - 1 x daily - 7 x weekly - 3 sets - 10 reps  Seated Gripping Towel - 1 x daily - 7 x weekly - 3 sets - 10 reps    Recommended using lesser DB weight with biceps curl at home  Access Code: [facility]  URL: [employer]/  Date: 01/17/2023  Prepared by: Swathi Thapa    Exercises  Supine Shoulder Flexion Extension AAROM with Dowel - 1 x daily - 7 x weekly - 3 sets - 10 reps  Supine Shoulder External Rotation in 45 Degrees Abduction AAROM with Dowel - 1 x daily - 7 x weekly - 3 sets - 10 reps  Standing Shoulder Abduction AAROM with Dowel - 1 x daily - 7 x weekly - 3 sets - 10 reps  Supine Shoulder Press with Dowel - 1 x daily - 7 x weekly - 3 sets - 10 reps    Access Code: [email or web address]  URL: [email or web address]  Date: 01/24/2023  Prepared by: Ilean Bath    Exercises  Standing Shoulder Row with Anchored Resistance - 1 x daily - 7 x weekly - 3 sets - 10 reps  Shoulder extension with resistance - Neutral - 1 x daily - 7 x weekly - 3 sets - 10 reps  Sidelying Shoulder External Rotation - 1 x daily - 7 x weekly - 3 sets - 10 reps  Supine Single Arm Shoulder Protraction - 1 x daily - 7 x weekly - 3 sets - 10 reps  Shoulder Flexion Wall Slide with Towel - 1 x daily - 7 x weekly - 3 sets - 10 reps    1/31/23:  shoulder downward press w/YTB for caudal glide and neuro re-ed. Access Code: [email or web address]  URL: [email or web address]  Date: 02/14/2023  Prepared by: Ilean Bath    Exercises  Prone Shoulder Horizontal Abduction - 1 x daily - 7 x weekly - 3 sets - 10 reps  Prone Shoulder Extension - Single Arm - 1 x daily - 7 x weekly - 3 sets - 10 reps  Prone Shoulder Row - 1 x daily - 7 x weekly - 3 sets - 10 reps  Prone Shoulder Extension - Single Arm with Dumbbell - 1 x daily - 7 x weekly - 3 sets - 10 reps  Standing Shoulder Single Arm PNF D2 Flexion with Resistance - 1 x daily - 7 x weekly - 3 sets - 10 reps          Manual Treatments:  PROM/MOBS to the left shoulder x 10'     Modalities: x    Communication:  POC faxed 12/30/22, PN 1/27/23    Assessment:  (Response towards treatment session) (Pain Rating)Pt with a good tolerance towards today's treatment session. Pt able to complete all activities without an exacerbation of symptoms. Pt was able to get more ROM after treatment and manual.  Pt would continue to benefit from skilled therapy interventions to address remaining impairments, improve mobility and strength and progress toward goal completion while reducing risk for re-injury or further decline. Pt rated pain at 0/10 today. Assessment: Pt is a 81 y.o. male w/DX L shoulder pain. Pt reports having chronic L shoulder pain after breaking his \"collar bone\" in 2005. Then recently, slipped into a hole in the ground and caught himself w/his left hand. Since then, increased pain noticed and was identified that there was an partial RC tear/tendonitis. Now, pt is s/p L RC repair on 12/12/22. PLOF:  Prior to this surgery, was independent w/all adls w/o left shoulder pain, was able to lift a good amount of weight overhead, had a 50# limit on lifting d/t hx back surgery. 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 Patient will benefit from physical therapy to improve strength, ROM and function of LUE in a graded fashion w/adhearance to precautions and protocols. Plan for Next Session: Proceed w/Phase 3 in clinic. Do no prescribe any HEP that cannot be performed w/o compensatory movement.   Specific Instructions for Next Treatment: shoulder protocol in soft chart    Time In / Time Out:  1300/ 1340    Timed Code/Total Treatment Minutes: 40'/ 36'  1 man ( 10') 2 TE (30')   Next Progress Note due:  3/17/23 (Only approved (15 + 15 )      Plan of Care Interventions:  [x] Therapeutic Exercise  [x] Modalities:  [x] Therapeutic Activity     [x] Ultrasound  [x] Estim  [] Gait Training      [] Cervical Traction [] Lumbar Traction  [x] Neuromuscular Re-education    [] Cold/hotpack [] Iontophoresis   [x] Instruction in HEP      [x] Vasopneumatic   [x] Dry Needling    [x] Manual Therapy               [] Aquatic Therapy              Electronically signed by:  Vidhi Castrejon PTA  3/17/2023, 12:16 PM     3/17/2023,2:07 PM